# Patient Record
Sex: FEMALE | Race: WHITE | NOT HISPANIC OR LATINO | Employment: PART TIME | URBAN - METROPOLITAN AREA
[De-identification: names, ages, dates, MRNs, and addresses within clinical notes are randomized per-mention and may not be internally consistent; named-entity substitution may affect disease eponyms.]

---

## 2018-05-18 ENCOUNTER — HISTORICAL (OUTPATIENT)
Dept: INTERNAL MEDICINE | Facility: CLINIC | Age: 58
End: 2018-05-18

## 2018-05-18 LAB
ABS NEUT (OLG): 3.2 X10(3)/MCL (ref 2.1–9.2)
APPEARANCE, UA: CLEAR
BACTERIA #/AREA URNS AUTO: ABNORMAL /[HPF]
BASOPHILS # BLD AUTO: 0.02 X10(3)/MCL
BASOPHILS NFR BLD AUTO: 0 %
BILIRUB UR QL STRIP: NEGATIVE
BUN SERPL-MCNC: 9 MG/DL (ref 7–18)
CALCIUM SERPL-MCNC: 9.3 MG/DL (ref 8.5–10.1)
CHLORIDE SERPL-SCNC: 108 MMOL/L (ref 98–107)
CHOLEST SERPL-MCNC: 175 MG/DL
CHOLEST/HDLC SERPL: 3.6 {RATIO} (ref 0–4.4)
CO2 SERPL-SCNC: 29 MMOL/L (ref 21–32)
COLOR UR: NORMAL
CREAT SERPL-MCNC: 0.8 MG/DL (ref 0.6–1.3)
CREAT UR-MCNC: 52 MG/DL
CREAT/UREA NIT SERPL: 11
DEPRECATED CALCIDIOL+CALCIFEROL SERPL-MC: 23.17 NG/ML (ref 30–80)
EOSINOPHIL # BLD AUTO: 0.12 X10(3)/MCL
EOSINOPHIL NFR BLD AUTO: 2 %
ERYTHROCYTE [DISTWIDTH] IN BLOOD BY AUTOMATED COUNT: 13.1 % (ref 11.5–14.5)
GLUCOSE (UA): NORMAL
GLUCOSE SERPL-MCNC: 92 MG/DL (ref 74–106)
HCT VFR BLD AUTO: 46.6 % (ref 35–46)
HDLC SERPL-MCNC: 49 MG/DL
HGB BLD-MCNC: 14.8 GM/DL (ref 12–16)
HGB UR QL STRIP: NEGATIVE
HYALINE CASTS #/AREA URNS LPF: ABNORMAL /[LPF]
IMM GRANULOCYTES # BLD AUTO: 0.02 10*3/UL
IMM GRANULOCYTES NFR BLD AUTO: 0 %
KETONES UR QL STRIP: NEGATIVE
LDLC SERPL CALC-MCNC: 108 MG/DL (ref 0–130)
LEUKOCYTE ESTERASE UR QL STRIP: NEGATIVE
LYMPHOCYTES # BLD AUTO: 1.9 X10(3)/MCL
LYMPHOCYTES NFR BLD AUTO: 33 % (ref 13–40)
MCH RBC QN AUTO: 30.6 PG (ref 26–34)
MCHC RBC AUTO-ENTMCNC: 31.8 GM/DL (ref 31–37)
MCV RBC AUTO: 96.3 FL (ref 80–100)
MICROALBUMIN UR-MCNC: <5 MG/L (ref 0–19)
MICROALBUMIN/CREAT RATIO PNL UR: <9.6 MCG/MG CR (ref 0–29)
MONOCYTES # BLD AUTO: 0.56 X10(3)/MCL
MONOCYTES NFR BLD AUTO: 10 % (ref 4–12)
NEUTROPHILS # BLD AUTO: 3.2 X10(3)/MCL
NEUTROPHILS NFR BLD AUTO: 55 X10(3)/MCL
NITRITE UR QL STRIP: NEGATIVE
PH UR STRIP: 8 [PH] (ref 4.5–8)
PLATELET # BLD AUTO: 346 X10(3)/MCL (ref 130–400)
PMV BLD AUTO: 10.6 FL (ref 7.4–10.4)
POTASSIUM SERPL-SCNC: 4 MMOL/L (ref 3.5–5.1)
PROT UR QL STRIP: NEGATIVE
RBC # BLD AUTO: 4.84 X10(6)/MCL (ref 4–5.2)
RBC #/AREA URNS AUTO: ABNORMAL /[HPF]
SODIUM SERPL-SCNC: 141 MMOL/L (ref 136–145)
SP GR UR STRIP: 1.01 (ref 1–1.03)
SQUAMOUS #/AREA URNS LPF: ABNORMAL /[LPF]
TRIGL SERPL-MCNC: 90 MG/DL
TSH SERPL-ACNC: 2.86 MIU/L (ref 0.36–3.74)
UROBILINOGEN UR STRIP-ACNC: NORMAL
VLDLC SERPL CALC-MCNC: 18 MG/DL
WBC # SPEC AUTO: 5.8 X10(3)/MCL (ref 4.5–11)
WBC #/AREA URNS AUTO: ABNORMAL /HPF

## 2018-07-09 ENCOUNTER — HISTORICAL (OUTPATIENT)
Dept: INTERNAL MEDICINE | Facility: CLINIC | Age: 58
End: 2018-07-09

## 2018-12-27 ENCOUNTER — HISTORICAL (OUTPATIENT)
Dept: ADMINISTRATIVE | Facility: HOSPITAL | Age: 58
End: 2018-12-27

## 2019-04-03 LAB
HUMAN PAPILLOMAVIRUS (HPV): NORMAL
PAP RECOMMENDATION EXT: NORMAL
PAP SMEAR: NORMAL

## 2019-06-21 ENCOUNTER — HISTORICAL (OUTPATIENT)
Dept: INTERNAL MEDICINE | Facility: CLINIC | Age: 59
End: 2019-06-21

## 2019-06-21 ENCOUNTER — HISTORICAL (OUTPATIENT)
Dept: ADMINISTRATIVE | Facility: HOSPITAL | Age: 59
End: 2019-06-21

## 2019-06-21 LAB
ABS NEUT (OLG): 3.34 X10(3)/MCL (ref 2.1–9.2)
ALBUMIN SERPL-MCNC: 4.1 GM/DL (ref 3.4–5)
ALBUMIN/GLOB SERPL: 1.1 RATIO (ref 1.1–2)
ALP SERPL-CCNC: 141 UNIT/L (ref 45–117)
ALT SERPL-CCNC: 20 UNIT/L (ref 12–78)
APPEARANCE, UA: CLEAR
AST SERPL-CCNC: 10 UNIT/L (ref 15–37)
BACTERIA #/AREA URNS AUTO: ABNORMAL /[HPF]
BASOPHILS # BLD AUTO: 0.01 X10(3)/MCL
BASOPHILS NFR BLD AUTO: 0 %
BILIRUB SERPL-MCNC: 0.7 MG/DL (ref 0.2–1)
BILIRUB UR QL STRIP: NEGATIVE
BILIRUBIN DIRECT+TOT PNL SERPL-MCNC: 0.2 MG/DL
BILIRUBIN DIRECT+TOT PNL SERPL-MCNC: 0.5 MG/DL
BUN SERPL-MCNC: 14 MG/DL (ref 7–18)
CALCIUM SERPL-MCNC: 9.6 MG/DL (ref 8.5–10.1)
CHLORIDE SERPL-SCNC: 107 MMOL/L (ref 98–107)
CHOLEST SERPL-MCNC: 167 MG/DL
CHOLEST/HDLC SERPL: 3.5 {RATIO} (ref 0–4.4)
CO2 SERPL-SCNC: 30 MMOL/L (ref 21–32)
COLOR UR: YELLOW
CREAT SERPL-MCNC: 0.9 MG/DL (ref 0.6–1.3)
EOSINOPHIL # BLD AUTO: 0.08 X10(3)/MCL
EOSINOPHIL NFR BLD AUTO: 1 %
ERYTHROCYTE [DISTWIDTH] IN BLOOD BY AUTOMATED COUNT: 13.2 % (ref 11.5–14.5)
EST. AVERAGE GLUCOSE BLD GHB EST-MCNC: 108 MG/DL
GLOBULIN SER-MCNC: 3.6 GM/ML (ref 2.3–3.5)
GLUCOSE (UA): NORMAL
GLUCOSE SERPL-MCNC: 91 MG/DL (ref 74–106)
HBA1C MFR BLD: 5.4 % (ref 4.2–6.3)
HCT VFR BLD AUTO: 48.6 % (ref 35–46)
HDLC SERPL-MCNC: 48 MG/DL
HGB BLD-MCNC: 15.2 GM/DL (ref 12–16)
HGB UR QL STRIP: NEGATIVE
HIV 1+2 AB+HIV1 P24 AG SERPL QL IA: NONREACTIVE
HYALINE CASTS #/AREA URNS LPF: ABNORMAL /[LPF]
IMM GRANULOCYTES # BLD AUTO: 0.01 10*3/UL
IMM GRANULOCYTES NFR BLD AUTO: 0 %
KETONES UR QL STRIP: NEGATIVE
LDLC SERPL CALC-MCNC: 99 MG/DL (ref 0–130)
LEUKOCYTE ESTERASE UR QL STRIP: 500 LEU/UL
LYMPHOCYTES # BLD AUTO: 1.81 X10(3)/MCL
LYMPHOCYTES NFR BLD AUTO: 31 % (ref 13–40)
MCH RBC QN AUTO: 31.1 PG (ref 26–34)
MCHC RBC AUTO-ENTMCNC: 31.3 GM/DL (ref 31–37)
MCV RBC AUTO: 99.6 FL (ref 80–100)
MONOCYTES # BLD AUTO: 0.6 X10(3)/MCL
MONOCYTES NFR BLD AUTO: 10 % (ref 4–12)
NEUTROPHILS # BLD AUTO: 3.34 X10(3)/MCL
NEUTROPHILS NFR BLD AUTO: 57 X10(3)/MCL
NITRITE UR QL STRIP: NEGATIVE
PH UR STRIP: 6.5 [PH] (ref 4.5–8)
PLATELET # BLD AUTO: 363 X10(3)/MCL (ref 130–400)
PMV BLD AUTO: 10.3 FL (ref 7.4–10.4)
POTASSIUM SERPL-SCNC: 4 MMOL/L (ref 3.5–5.1)
PROT SERPL-MCNC: 7.7 GM/DL (ref 6.4–8.2)
PROT UR QL STRIP: NEGATIVE
RBC # BLD AUTO: 4.88 X10(6)/MCL (ref 4–5.2)
RBC #/AREA URNS AUTO: ABNORMAL /[HPF]
SODIUM SERPL-SCNC: 140 MMOL/L (ref 136–145)
SP GR UR STRIP: 1.02 (ref 1–1.03)
SQUAMOUS #/AREA URNS LPF: >100 /[LPF]
TRIGL SERPL-MCNC: 98 MG/DL
TSH SERPL-ACNC: 3.59 MIU/L (ref 0.36–3.74)
UROBILINOGEN UR STRIP-ACNC: NORMAL
VLDLC SERPL CALC-MCNC: 20 MG/DL
WBC # SPEC AUTO: 5.8 X10(3)/MCL (ref 4.5–11)
WBC #/AREA URNS AUTO: ABNORMAL /HPF

## 2019-06-23 LAB — FINAL CULTURE: NORMAL

## 2019-07-19 ENCOUNTER — HISTORICAL (OUTPATIENT)
Dept: INTERNAL MEDICINE | Facility: CLINIC | Age: 59
End: 2019-07-19

## 2019-08-28 ENCOUNTER — HISTORICAL (OUTPATIENT)
Dept: RADIOLOGY | Facility: HOSPITAL | Age: 59
End: 2019-08-28

## 2019-09-13 ENCOUNTER — HISTORICAL (OUTPATIENT)
Dept: RADIOLOGY | Facility: HOSPITAL | Age: 59
End: 2019-09-13

## 2019-11-08 ENCOUNTER — HISTORICAL (OUTPATIENT)
Dept: RADIOLOGY | Facility: HOSPITAL | Age: 59
End: 2019-11-08

## 2019-12-23 ENCOUNTER — HISTORICAL (OUTPATIENT)
Dept: INTERNAL MEDICINE | Facility: CLINIC | Age: 59
End: 2019-12-23

## 2019-12-23 LAB
ABS NEUT (OLG): 3.07 X10(3)/MCL (ref 2.1–9.2)
ALBUMIN SERPL-MCNC: 3.9 GM/DL (ref 3.4–5)
ALBUMIN/GLOB SERPL: 1.1 RATIO (ref 1.1–2)
ALP SERPL-CCNC: 150 UNIT/L (ref 45–117)
ALT SERPL-CCNC: 29 UNIT/L (ref 12–78)
APPEARANCE, UA: CLEAR
AST SERPL-CCNC: 21 UNIT/L (ref 15–37)
BACTERIA #/AREA URNS AUTO: ABNORMAL /HPF
BASOPHILS # BLD AUTO: 0 X10(3)/MCL (ref 0–0.2)
BASOPHILS NFR BLD AUTO: 0 %
BILIRUB SERPL-MCNC: 0.6 MG/DL (ref 0.2–1)
BILIRUB UR QL STRIP: NEGATIVE
BILIRUBIN DIRECT+TOT PNL SERPL-MCNC: 0.2 MG/DL (ref 0–0.2)
BILIRUBIN DIRECT+TOT PNL SERPL-MCNC: 0.4 MG/DL
BUN SERPL-MCNC: 12 MG/DL (ref 7–18)
CALCIUM SERPL-MCNC: 9.4 MG/DL (ref 8.5–10.1)
CHLORIDE SERPL-SCNC: 106 MMOL/L (ref 98–107)
CO2 SERPL-SCNC: 31 MMOL/L (ref 21–32)
COLOR UR: NORMAL
CREAT SERPL-MCNC: 0.8 MG/DL (ref 0.6–1.3)
EOSINOPHIL # BLD AUTO: 0.1 X10(3)/MCL (ref 0–0.9)
EOSINOPHIL NFR BLD AUTO: 2 %
ERYTHROCYTE [DISTWIDTH] IN BLOOD BY AUTOMATED COUNT: 13.1 % (ref 11.5–14.5)
GLOBULIN SER-MCNC: 3.6 GM/ML (ref 2.3–3.5)
GLUCOSE (UA): NEGATIVE
GLUCOSE SERPL-MCNC: 96 MG/DL (ref 74–106)
HCT VFR BLD AUTO: 45.6 % (ref 35–46)
HGB BLD-MCNC: 14.4 GM/DL (ref 12–16)
HGB UR QL STRIP: NEGATIVE
HYALINE CASTS #/AREA URNS LPF: ABNORMAL /LPF
IMM GRANULOCYTES # BLD AUTO: 0.04 10*3/UL
IMM GRANULOCYTES NFR BLD AUTO: 1 %
KETONES UR QL STRIP: NEGATIVE
LEUKOCYTE ESTERASE UR QL STRIP: NEGATIVE
LYMPHOCYTES # BLD AUTO: 1.9 X10(3)/MCL (ref 0.6–4.6)
LYMPHOCYTES NFR BLD AUTO: 33 %
MCH RBC QN AUTO: 31.2 PG (ref 26–34)
MCHC RBC AUTO-ENTMCNC: 31.6 GM/DL (ref 31–37)
MCV RBC AUTO: 98.7 FL (ref 80–100)
MONOCYTES # BLD AUTO: 0.7 X10(3)/MCL (ref 0.1–1.3)
MONOCYTES NFR BLD AUTO: 12 %
NEUTROPHILS # BLD AUTO: 3.07 X10(3)/MCL (ref 2.1–9.2)
NEUTROPHILS NFR BLD AUTO: 53 %
NITRITE UR QL STRIP: NEGATIVE
PH UR STRIP: 7.5 [PH] (ref 4.5–8)
PLATELET # BLD AUTO: 362 X10(3)/MCL (ref 130–400)
PMV BLD AUTO: 10.5 FL (ref 7.4–10.4)
POTASSIUM SERPL-SCNC: 4.2 MMOL/L (ref 3.5–5.1)
PROT SERPL-MCNC: 7.5 GM/DL (ref 6.4–8.2)
PROT UR QL STRIP: NEGATIVE
RBC # BLD AUTO: 4.62 X10(6)/MCL (ref 4–5.2)
RBC #/AREA URNS AUTO: ABNORMAL /HPF
SODIUM SERPL-SCNC: 143 MMOL/L (ref 136–145)
SP GR UR STRIP: 1.01 (ref 1–1.03)
SQUAMOUS #/AREA URNS LPF: ABNORMAL /LPF
UROBILINOGEN UR STRIP-ACNC: NORMAL
WBC # SPEC AUTO: 5.8 X10(3)/MCL (ref 4.5–11)
WBC #/AREA URNS AUTO: ABNORMAL /HPF

## 2020-05-25 ENCOUNTER — HISTORICAL (OUTPATIENT)
Dept: CARDIOLOGY | Facility: HOSPITAL | Age: 60
End: 2020-05-25

## 2020-06-16 ENCOUNTER — HISTORICAL (OUTPATIENT)
Dept: INTERNAL MEDICINE | Facility: CLINIC | Age: 60
End: 2020-06-16

## 2020-06-16 LAB
ABS NEUT (OLG): 3.11 X10(3)/MCL (ref 2.1–9.2)
ALBUMIN SERPL-MCNC: 3.8 GM/DL (ref 3.4–5)
ALBUMIN/GLOB SERPL: 1 RATIO (ref 1.1–2)
ALP SERPL-CCNC: 127 UNIT/L (ref 45–117)
ALT SERPL-CCNC: 21 UNIT/L (ref 12–78)
APPEARANCE, UA: CLEAR
AST SERPL-CCNC: 12 UNIT/L (ref 15–37)
BACTERIA #/AREA URNS AUTO: ABNORMAL /HPF
BASOPHILS # BLD AUTO: 0 X10(3)/MCL (ref 0–0.2)
BASOPHILS NFR BLD AUTO: 0 %
BILIRUB SERPL-MCNC: 0.7 MG/DL (ref 0.2–1)
BILIRUB UR QL STRIP: NEGATIVE
BILIRUBIN DIRECT+TOT PNL SERPL-MCNC: 0.2 MG/DL (ref 0–0.2)
BILIRUBIN DIRECT+TOT PNL SERPL-MCNC: 0.5 MG/DL
BUN SERPL-MCNC: 11 MG/DL (ref 7–18)
CALCIUM SERPL-MCNC: 9.2 MG/DL (ref 8.5–10.1)
CHLORIDE SERPL-SCNC: 108 MMOL/L (ref 98–107)
CHOLEST SERPL-MCNC: 164 MG/DL
CHOLEST/HDLC SERPL: 3.6 {RATIO} (ref 0–4.4)
CO2 SERPL-SCNC: 30 MMOL/L (ref 21–32)
COLOR UR: NORMAL
CREAT SERPL-MCNC: 0.8 MG/DL (ref 0.6–1.3)
EOSINOPHIL # BLD AUTO: 0.2 X10(3)/MCL (ref 0–0.9)
EOSINOPHIL NFR BLD AUTO: 3 %
ERYTHROCYTE [DISTWIDTH] IN BLOOD BY AUTOMATED COUNT: 13.1 % (ref 11.5–14.5)
EST. AVERAGE GLUCOSE BLD GHB EST-MCNC: 103 MG/DL
GLOBULIN SER-MCNC: 3.9 GM/ML (ref 2.3–3.5)
GLUCOSE (UA): NEGATIVE
GLUCOSE SERPL-MCNC: 94 MG/DL (ref 74–106)
HBA1C MFR BLD: 5.2 % (ref 4.2–6.3)
HCT VFR BLD AUTO: 46.8 % (ref 35–46)
HDLC SERPL-MCNC: 45 MG/DL (ref 40–59)
HGB BLD-MCNC: 14.8 GM/DL (ref 12–16)
HGB UR QL STRIP: NEGATIVE
HYALINE CASTS #/AREA URNS LPF: ABNORMAL /LPF
IMM GRANULOCYTES # BLD AUTO: 0.01 10*3/UL
IMM GRANULOCYTES NFR BLD AUTO: 0 %
KETONES UR QL STRIP: NEGATIVE
LDLC SERPL CALC-MCNC: 100 MG/DL
LEUKOCYTE ESTERASE UR QL STRIP: NEGATIVE
LYMPHOCYTES # BLD AUTO: 1.8 X10(3)/MCL (ref 0.6–4.6)
LYMPHOCYTES NFR BLD AUTO: 31 %
MCH RBC QN AUTO: 31.3 PG (ref 26–34)
MCHC RBC AUTO-ENTMCNC: 31.6 GM/DL (ref 31–37)
MCV RBC AUTO: 98.9 FL (ref 80–100)
MONOCYTES # BLD AUTO: 0.6 X10(3)/MCL (ref 0.1–1.3)
MONOCYTES NFR BLD AUTO: 11 %
NEUTROPHILS # BLD AUTO: 3.11 X10(3)/MCL (ref 2.1–9.2)
NEUTROPHILS NFR BLD AUTO: 54 %
NITRITE UR QL STRIP: NEGATIVE
PH UR STRIP: 8 [PH] (ref 4.5–8)
PLATELET # BLD AUTO: 332 X10(3)/MCL (ref 130–400)
PMV BLD AUTO: 10.4 FL (ref 7.4–10.4)
POTASSIUM SERPL-SCNC: 4.6 MMOL/L (ref 3.5–5.1)
PROT SERPL-MCNC: 7.7 GM/DL (ref 6.4–8.2)
PROT UR QL STRIP: NEGATIVE
RBC # BLD AUTO: 4.73 X10(6)/MCL (ref 4–5.2)
RBC #/AREA URNS AUTO: ABNORMAL /HPF
SODIUM SERPL-SCNC: 142 MMOL/L (ref 136–145)
SP GR UR STRIP: 1.01 (ref 1–1.03)
SQUAMOUS #/AREA URNS LPF: ABNORMAL /LPF
TRIGL SERPL-MCNC: 96 MG/DL
TSH SERPL-ACNC: 3.07 MIU/L (ref 0.36–3.74)
UROBILINOGEN UR STRIP-ACNC: NORMAL
VLDLC SERPL CALC-MCNC: 19 MG/DL
WBC # SPEC AUTO: 5.7 X10(3)/MCL (ref 4.5–11)
WBC #/AREA URNS AUTO: ABNORMAL /HPF

## 2020-09-23 ENCOUNTER — HISTORICAL (OUTPATIENT)
Dept: RADIOLOGY | Facility: HOSPITAL | Age: 60
End: 2020-09-23

## 2021-02-26 ENCOUNTER — HISTORICAL (OUTPATIENT)
Dept: RADIOLOGY | Facility: HOSPITAL | Age: 61
End: 2021-02-26

## 2021-04-20 ENCOUNTER — HISTORICAL (OUTPATIENT)
Dept: RADIOLOGY | Facility: HOSPITAL | Age: 61
End: 2021-04-20

## 2021-06-22 ENCOUNTER — HISTORICAL (OUTPATIENT)
Dept: INTERNAL MEDICINE | Facility: CLINIC | Age: 61
End: 2021-06-22

## 2021-06-22 LAB
ABS NEUT (OLG): 3.48 X10(3)/MCL (ref 2.1–9.2)
ALBUMIN SERPL-MCNC: 4.1 GM/DL (ref 3.5–5)
ALBUMIN/GLOB SERPL: 1.2 RATIO (ref 1.1–2)
ALP SERPL-CCNC: 124 UNIT/L (ref 40–150)
ALT SERPL-CCNC: 18 UNIT/L (ref 0–55)
APPEARANCE, UA: CLEAR
AST SERPL-CCNC: 20 UNIT/L (ref 5–34)
BACTERIA #/AREA URNS AUTO: ABNORMAL /HPF
BASOPHILS # BLD AUTO: 0 X10(3)/MCL (ref 0–0.2)
BASOPHILS NFR BLD AUTO: 0 %
BILIRUB SERPL-MCNC: 0.8 MG/DL
BILIRUB UR QL STRIP: NEGATIVE
BILIRUBIN DIRECT+TOT PNL SERPL-MCNC: 0.3 MG/DL (ref 0–0.5)
BILIRUBIN DIRECT+TOT PNL SERPL-MCNC: 0.5 MG/DL (ref 0–0.8)
BUN SERPL-MCNC: 10.3 MG/DL (ref 9.8–20.1)
CALCIUM SERPL-MCNC: 9.9 MG/DL (ref 8.4–10.2)
CHLORIDE SERPL-SCNC: 104 MMOL/L (ref 98–107)
CHOLEST SERPL-MCNC: 161 MG/DL
CHOLEST/HDLC SERPL: 4 {RATIO} (ref 0–5)
CO2 SERPL-SCNC: 29 MMOL/L (ref 22–29)
COLOR UR: ABNORMAL
CREAT SERPL-MCNC: 0.87 MG/DL (ref 0.55–1.02)
EOSINOPHIL # BLD AUTO: 0.1 X10(3)/MCL (ref 0–0.9)
EOSINOPHIL NFR BLD AUTO: 1 %
ERYTHROCYTE [DISTWIDTH] IN BLOOD BY AUTOMATED COUNT: 13.3 % (ref 11.5–14.5)
EST. AVERAGE GLUCOSE BLD GHB EST-MCNC: 102.5 MG/DL
GLOBULIN SER-MCNC: 3.5 GM/DL (ref 2.4–3.5)
GLUCOSE (UA): NEGATIVE
GLUCOSE SERPL-MCNC: 86 MG/DL (ref 74–100)
HBA1C MFR BLD: 5.2 %
HCT VFR BLD AUTO: 48.2 % (ref 35–46)
HDLC SERPL-MCNC: 42 MG/DL (ref 35–60)
HGB BLD-MCNC: 15 GM/DL (ref 12–16)
HGB UR QL STRIP: NEGATIVE
HYALINE CASTS #/AREA URNS LPF: ABNORMAL /LPF
IMM GRANULOCYTES # BLD AUTO: 0.02 10*3/UL
IMM GRANULOCYTES NFR BLD AUTO: 0 %
KETONES UR QL STRIP: NEGATIVE
LDLC SERPL CALC-MCNC: 98 MG/DL (ref 50–140)
LEUKOCYTE ESTERASE UR QL STRIP: NEGATIVE
LYMPHOCYTES # BLD AUTO: 2.1 X10(3)/MCL (ref 0.6–4.6)
LYMPHOCYTES NFR BLD AUTO: 34 %
MCH RBC QN AUTO: 30.5 PG (ref 26–34)
MCHC RBC AUTO-ENTMCNC: 31.1 GM/DL (ref 31–37)
MCV RBC AUTO: 98 FL (ref 80–100)
MONOCYTES # BLD AUTO: 0.6 X10(3)/MCL (ref 0.1–1.3)
MONOCYTES NFR BLD AUTO: 9 %
NEUTROPHILS # BLD AUTO: 3.48 X10(3)/MCL (ref 2.1–9.2)
NEUTROPHILS NFR BLD AUTO: 56 %
NITRITE UR QL STRIP: NEGATIVE
NRBC BLD AUTO-RTO: 0 % (ref 0–0.2)
PH UR STRIP: 8.5 [PH] (ref 4.5–8)
PLATELET # BLD AUTO: 386 X10(3)/MCL (ref 130–400)
PMV BLD AUTO: 10.7 FL (ref 7.4–10.4)
POTASSIUM SERPL-SCNC: 4 MMOL/L (ref 3.5–5.1)
PROT SERPL-MCNC: 7.6 GM/DL (ref 6.4–8.3)
PROT UR QL STRIP: NEGATIVE
RBC # BLD AUTO: 4.92 X10(6)/MCL (ref 4–5.2)
RBC #/AREA URNS AUTO: ABNORMAL /HPF
SODIUM SERPL-SCNC: 141 MMOL/L (ref 136–145)
SP GR UR STRIP: 1 (ref 1–1.03)
SQUAMOUS #/AREA URNS LPF: ABNORMAL /LPF
TRIGL SERPL-MCNC: 106 MG/DL (ref 37–140)
TSH SERPL-ACNC: 2.86 UIU/ML (ref 0.35–4.94)
UROBILINOGEN UR STRIP-ACNC: NORMAL
VLDLC SERPL CALC-MCNC: 21 MG/DL
WBC # SPEC AUTO: 6.2 X10(3)/MCL (ref 4.5–11)
WBC #/AREA URNS AUTO: ABNORMAL /HPF

## 2021-07-21 ENCOUNTER — HISTORICAL (OUTPATIENT)
Dept: ADMINISTRATIVE | Facility: HOSPITAL | Age: 61
End: 2021-07-21

## 2021-07-21 LAB
FLUAV AG UPPER RESP QL IA.RAPID: NEGATIVE
FLUBV AG UPPER RESP QL IA.RAPID: NEGATIVE
SARS-COV-2 RNA RESP QL NAA+PROBE: DETECTED

## 2021-07-25 ENCOUNTER — HISTORICAL (OUTPATIENT)
Dept: INFECTIOUS DISEASES | Facility: HOSPITAL | Age: 61
End: 2021-07-25

## 2021-10-05 ENCOUNTER — HISTORICAL (OUTPATIENT)
Dept: RADIOLOGY | Facility: HOSPITAL | Age: 61
End: 2021-10-05

## 2021-10-05 LAB — BCS RECOMMENDATION EXT: NORMAL

## 2021-12-16 ENCOUNTER — HISTORICAL (OUTPATIENT)
Dept: INTERNAL MEDICINE | Facility: CLINIC | Age: 61
End: 2021-12-16

## 2021-12-16 LAB
ABS NEUT (OLG): 3.79 X10(3)/MCL (ref 2.1–9.2)
ALBUMIN SERPL-MCNC: 4 GM/DL (ref 3.4–4.8)
ALBUMIN/GLOB SERPL: 1.1 RATIO (ref 1.1–2)
ALP SERPL-CCNC: 139 UNIT/L (ref 40–150)
ALT SERPL-CCNC: 18 UNIT/L (ref 0–55)
AST SERPL-CCNC: 17 UNIT/L (ref 5–34)
BASOPHILS # BLD AUTO: 0 X10(3)/MCL (ref 0–0.2)
BASOPHILS NFR BLD AUTO: 0 %
BILIRUB SERPL-MCNC: 0.7 MG/DL
BILIRUBIN DIRECT+TOT PNL SERPL-MCNC: 0.3 MG/DL (ref 0–0.5)
BILIRUBIN DIRECT+TOT PNL SERPL-MCNC: 0.4 MG/DL (ref 0–0.8)
BUN SERPL-MCNC: 12.2 MG/DL (ref 9.8–20.1)
CALCIUM SERPL-MCNC: 10.1 MG/DL (ref 8.7–10.5)
CHLORIDE SERPL-SCNC: 106 MMOL/L (ref 98–107)
CO2 SERPL-SCNC: 29 MMOL/L (ref 23–31)
CREAT SERPL-MCNC: 0.88 MG/DL (ref 0.55–1.02)
EOSINOPHIL # BLD AUTO: 0.1 X10(3)/MCL (ref 0–0.9)
EOSINOPHIL NFR BLD AUTO: 1 %
ERYTHROCYTE [DISTWIDTH] IN BLOOD BY AUTOMATED COUNT: 13.3 % (ref 11.5–14.5)
GLOBULIN SER-MCNC: 3.6 GM/DL (ref 2.4–3.5)
GLUCOSE SERPL-MCNC: 95 MG/DL (ref 82–115)
HCT VFR BLD AUTO: 47.1 % (ref 35–46)
HGB BLD-MCNC: 15.2 GM/DL (ref 12–16)
IMM GRANULOCYTES # BLD AUTO: 0.01 10*3/UL
IMM GRANULOCYTES NFR BLD AUTO: 0 %
LYMPHOCYTES # BLD AUTO: 2.1 X10(3)/MCL (ref 0.6–4.6)
LYMPHOCYTES NFR BLD AUTO: 32 %
MCH RBC QN AUTO: 31.5 PG (ref 26–34)
MCHC RBC AUTO-ENTMCNC: 32.3 GM/DL (ref 31–37)
MCV RBC AUTO: 97.5 FL (ref 80–100)
MONOCYTES # BLD AUTO: 0.6 X10(3)/MCL (ref 0.1–1.3)
MONOCYTES NFR BLD AUTO: 9 %
NEUTROPHILS # BLD AUTO: 3.79 X10(3)/MCL (ref 2.1–9.2)
NEUTROPHILS NFR BLD AUTO: 57 %
NRBC BLD AUTO-RTO: 0 % (ref 0–0.2)
PLATELET # BLD AUTO: 344 X10(3)/MCL (ref 130–400)
PMV BLD AUTO: 9.7 FL (ref 7.4–10.4)
POTASSIUM SERPL-SCNC: 4.2 MMOL/L (ref 3.5–5.1)
PROT SERPL-MCNC: 7.6 GM/DL (ref 5.8–7.6)
RBC # BLD AUTO: 4.83 X10(6)/MCL (ref 4–5.2)
SODIUM SERPL-SCNC: 142 MMOL/L (ref 136–145)
WBC # SPEC AUTO: 6.6 X10(3)/MCL (ref 4.5–11)

## 2022-03-23 ENCOUNTER — HISTORICAL (OUTPATIENT)
Dept: ENDOSCOPY | Facility: HOSPITAL | Age: 62
End: 2022-03-23

## 2022-04-10 ENCOUNTER — HISTORICAL (OUTPATIENT)
Dept: ADMINISTRATIVE | Facility: HOSPITAL | Age: 62
End: 2022-04-10

## 2022-04-25 VITALS
BODY MASS INDEX: 31.7 KG/M2 | SYSTOLIC BLOOD PRESSURE: 114 MMHG | WEIGHT: 190.25 LBS | OXYGEN SATURATION: 100 % | DIASTOLIC BLOOD PRESSURE: 75 MMHG | HEIGHT: 65 IN

## 2022-05-03 NOTE — HISTORICAL [facility] CERNER
This is a historical note converted from [employer]. Formatting and pictures may have been removed.  Please reference [employer] for original formatting and attached multimedia. Chief Complaint  F/U, no complaints  History of Present Illness  Initial Visit (12/27/18):58 y.o.  female presenting to the clinic to re-establish primary care. Previous PCP JR Ge NP. Last OV 7/25/18. PMHx significant for HTN. Bp?at goal.?Taking Amlodipine 10 mg po daily. Today, pt c/o cough, chest congestion, and chest soreness x 2+ weeks. Cough is nonproductive.?Denies fever, chills, sore throat, or ear pain. Associated symptoms include HA. Denies SOB at present or wheezing. Not taking any medications for cough. No other problems stated.  ?   Todays Visit (6/26/19):58 y.o.  female with PMHx significant for HTN, presenting for routine f/u and lab results. Bp?at goal.?Taking Amlodipine 10 mg po daily. Alk phos elevated, although improved from prior assessments. Pt with hx of cholecystectomy. She denies any abdominal complaints. UA also revealed increased WBCs and leuk esterase. No bacteria.?Pt denies dysuria, urinary urgency, urinary frequency, malodorous urine, abd pain, or any urinary complaints. Denies CP or SOB. No other problems stated.  ?   Mammo scheduled 9/6/19  Review of Systems  Constitutional:?no fever, fatigue, weakness  Eye:?no vision loss, eye redness, drainage, or pain  ENMT:?no sore throat, ear pain, sinus pain/congestion, nasal congestion/drainage  Respiratory:?no cough, no wheezing, no shortness of breath  Cardiovascular:?no chest pain, no palpitations, no edema  Gastrointestinal:?no nausea, vomiting, or diarrhea. No abdominal pain  Genitourinary:?no dysuria, no urinary frequency or urgency, no hematuria  Hema/Lymph:?no abnormal bruising or bleeding  Endocrine:?no heat or cold intolerance, no excessive thirst or excessive urination  Musculoskeletal:?no muscle or joint pain, no joint  swelling  Integumentary:?no skin rash or abnormal lesion  Neurologic: no headache, no seizure, no dizziness, no weakness or numbness  Physical Exam  Vitals & Measurements  T:?36.4? ?C (Oral)? HR:?75(Peripheral)? RR:?20? BP:?113/71?  HT:?165?cm? WT:?88.3?kg? BMI:?32.43?  General:?well-developed, well-nourished, no acute distress  Eye: PERRLA, EOMI, clear conjunctiva, eyelids normal  HENT:?oropharynx without erythema/exudate,?mucosal surfaces moist  Neck: full range of motion, no thyromegaly or lymphadenopathy; No carotid bruit  Respiratory:?clear to auscultation bilaterally. No rales, wheezing, or rhonchi. Chest expansion symmetrical  Cardiovascular:?regular rate and rhythm without murmurs, gallops, or rubs, peripheral pulses normal. No peripheral swelling  Gastrointestinal:?soft, non-tender, non-distended with normal bowel sounds, without masses to palpation  Genitourinary: no CVA tenderness to palpation  Musculoskeletal:?full range of motion of all extremities/spine without limitation or discomfort.?Normal strength and tone  Integumentary: no rashes or skin lesions present  Neurologic: cranial nerves I-XII?intact, no signs of peripheral neurological deficit, motor/sensory function intact  Psychological: Calm and cooperative. Affect and Mood appropriate. Judgment intact  Assessment/Plan  Elevated alkaline phosphatase level?R74.8  Obtain Abd US to eval  Ordered:  1160F- Medication reconciliation completed during visit, Hypertension  Elevated alkaline phosphatase level  Obesity, [facility], 06/28/19 8:56:00 CDT  CBC w/ Auto Diff, Routine collect, *Est. 12/28/19 3:00:00 CST, Blood, Order for future visit, *Est. Stop date 12/28/19 3:00:00 CST, Lab Collect, Hypertension  Elevated alkaline phosphatase level, 06/28/19 8:56:00 CDT  Clinic Follow up, *Est. 12/30/19 8:20:00 CST, Order for future visit, Elevated alkaline phosphatase level, [facility]  Comprehensive Metabolic Panel, Routine collect, *Est. 12/28/19  3:00:00 CST, Blood, Order for future visit, *Est. Stop date 12/28/19 3:00:00 CST, Lab Collect, Hypertension  Elevated alkaline phosphatase level, 06/28/19 8:56:00 CDT  Office/Outpatient Visit Level 3 Established [id number] PC, Hypertension  Elevated alkaline phosphatase level  Obesity, [facility] Int [facility] C, 06/28/19 8:56:00 CDT  Urinalysis with Microscopic if Indicated, Routine collect, Urine, Order for future visit, *Est. 12/28/19 3:00:00 CST, *Est. Stop date 12/28/19 3:00:00 CST, Nurse collect, Hypertension  Elevated alkaline phosphatase level, Print Label By Order Location  US Abdomen Limited, Routine, *Est. 08/28/19 3:00:00 CDT, Other (please specify), None, Ambulatory, Please schedule on same day as mammo if feasible, Rad Type, Order for future visit, Elevated alkaline phosphatase level, Schedule this test, [facility]...  ?  Hypertension?I10  At goal  Continue Amlodipine 10 mg po daily  Educated on aerobic exercise (3-5 days/week) and a low-fat, low-sodium diet  Avoid excess ETOH consumption. Smoking cessation if applicable  ED precautions (s/s of CVA, etc)  Ordered:  amLODIPine, 10 mg = 1 tab(s), Oral, Daily, # 90 tab(s), 3 Refill(s), Pharmacy: [facility]  1160F- Medication reconciliation completed during visit, Hypertension  Elevated alkaline phosphatase level  Obesity, [facility], 06/28/19 8:56:00 CDT  CBC w/ Auto Diff, Routine collect, *Est. 12/28/19 3:00:00 CST, Blood, Order for future visit, *Est. Stop date 12/28/19 3:00:00 CST, Lab Collect, Hypertension  Elevated alkaline phosphatase level, 06/28/19 8:56:00 CDT  Clinic Follow up, *Est. 12/30/19 8:20:00 CST, Order for future visit, Elevated alkaline phosphatase level, [facility]  Comprehensive Metabolic Panel, Routine collect, *Est. 12/28/19 3:00:00 CST, Blood, Order for future visit, *Est. Stop date 12/28/19 3:00:00 CST, Lab Collect, Hypertension  Elevated alkaline phosphatase level, 06/28/19 8:56:00 CDT  Office/Outpatient Visit  Level 3 Established [id number] PC, Hypertension  Elevated alkaline phosphatase level  Obesity, [facility], 06/28/19 8:56:00 CDT  Urinalysis with Microscopic if Indicated, Routine collect, Urine, Order for future visit, *Est. 12/28/19 3:00:00 CST, *Est. Stop date 12/28/19 3:00:00 CST, Nurse collect, Hypertension  Elevated alkaline phosphatase level, Print Label By Order Location  ?  Obesity?E66.9  Educated on aerobic exercise for 30 mins/day, at least 5 days per week. Low-fat diet  Ordered:  1160F- Medication reconciliation completed during visit, Hypertension  Elevated alkaline phosphatase level  Obesity, [facility] Int [facility], 06/28/19 8:56:00 CDT  Office/Outpatient Visit Level 3 Established [id number] PC, Hypertension  Elevated alkaline phosphatase level  Obesity, [facility], 06/28/19 8:56:00 CDT  ?  RTC 6 months with labs prior/PRN  Referrals  Clinic Follow up, *Est. 12/30/19 8:20:00 CST, Order for future visit, Elevated alkaline phosphatase level, [facility]   Problem List/Past Medical History  Ongoing  Elevated alkaline phosphatase level  Hypertension  Obesity  Historical  Hypertension  Pregnant  Pregnant  Pregnant  Pregnant  Pregnant  Procedure/Surgical History  Fundoplication Nissen Laparoscopic (None) (06/29/2016)  Restriction of Esophagogastric Junction, Percutaneous Endoscopic Approach (06/29/2016)  Esophagogastroduodenoscopy, flexible, transoral; with biopsy, single or multiple (03/11/2016)  Excision of Small Intestine, Via Natural or Artificial Opening Endoscopic, Diagnostic (03/11/2016)  Cholecystectomy  Hernia  Tubal ligation   Medications  Norvasc 10 mg oral tablet, 10 mg= 1 tab(s), Oral, Daily, 3 refills  Allergies  Codeine Phosphate/PE/Promethazine HCl  NexIUM OTC?(Shortness of breath)  PriLOSEC OTC?(Shortness of breath)  Social History  Abuse/Neglect  No, 06/28/2019  Alcohol - Denies Alcohol Use, 04/07/2015  Never, 06/07/2016  Employment/School  Work/School description: homemaker. Operates hazardous  equipment: No., 05/12/2015  Exercise  Exercise frequency: 1-2 times/week. Self assessment: Fair condition. Exercise type: Walking, biking., 04/08/2016  Home/Environment  Lives with Children, Spouse. Living situation: Home/Independent. Alcohol abuse in household: No. Substance abuse in household: No. Smoker in household: No. Injuries/Abuse/Neglect in household: No. Feels unsafe at home: No. Safe place to go: Yes. Family/Friends available for support: Yes. Concern for family members at home: No. Major illness in household: No. Financial concerns: No. TV/Computer concerns: No., 04/08/2016  Nutrition/Health  Type of diet: regular. Regular, Caffeine intake amount: coffee daily. Wants to lose weight: Yes. Sleeping concerns: No. Feels highly stressed: No., 04/08/2016  Sexual  Sexually active: Yes. Number of current partners 1., 11/09/2018  Spiritual/Cultural  Anabaptism, 06/28/2019  Substance Use - Denies Substance Abuse, 04/07/2015  Never, 06/24/2016  Never, 06/07/2016  Tobacco - Denies Tobacco Use, 04/07/2015  Never (less than 100 in lifetime), N/A, 06/28/2019  Family History  Alcohol: Father.  Cancer: Father.  Tobacco use: Sister.  Health Maintenance  Health Maintenance  ???Pending?(in the next year)  ??? ??OverDue  ??? ? ? ?Diabetes Screening due??and every?  ??? ? ? ?Aspirin Therapy for CVD Prevention due??04/25/19??and every 1??year(s)  ??? ? ? ?Hypertension Management-Education due??04/25/19??and every 1??year(s)  ??? ??Due?  ??? ? ? ?Colorectal Screening due??05/17/19??and every 1??year(s)  ??? ??Refused?  ??? ? ? ?Tetanus Vaccine due??06/28/19??and every 10??year(s)  ??? ??Due In Future?  ??? ? ? ?ADL Screening not due until??12/27/19??and every 1??year(s)  ??? ? ? ?Alcohol Misuse Screening not due until??01/01/20??and every 1??year(s)  ??? ? ? ?Obesity Screening not due until??01/01/20??and every 1??year(s)  ??? ? ? ?Hypertension Management-BMP not due until??06/20/20??and every 1??year(s)  ??? ? ? ?Blood Pressure  Screening not due until??06/27/20??and every 1??year(s)  ??? ? ? ?Body Mass Index Check not due until??06/27/20??and every 1??year(s)  ??? ? ? ?Depression Screening not due until??06/27/20??and every 1??year(s)  ??? ? ? ?Hypertension Management-Blood Pressure not due until??06/27/20??and every 1??year(s)  ???Satisfied?(in the past 1 year)  ??? ??Satisfied?  ??? ? ? ?ADL Screening on??12/27/18.??Satisfied by Shanelle Loyd LPN  ??? ? ? ?Alcohol Misuse Screening on??06/28/19.??Satisfied by Yane Dubose NP  ??? ? ? ?Blood Pressure Screening on??06/28/19.??Satisfied by Kate Barraza LPN.  ??? ? ? ?Body Mass Index Check on??06/28/19.??Satisfied by Kate Barraza LPN.  ??? ? ? ?Breast Cancer Screening on??07/09/18.??Satisfied by Tarsha Mauro  ??? ? ? ?Cervical Cancer Screening on??04/03/19.??Satisfied by Kaykay Walsh  ??? ? ? ?Depression Screening on??06/28/19.??Satisfied by Kate Barraza LPN.  ??? ? ? ?Diabetes Screening on??06/21/19.??Satisfied by Jimmie Doe Jr.  ??? ? ? ?Hypertension Management-Blood Pressure on??06/28/19.??Satisfied by Kate Barraza LPN.  ??? ? ? ?Influenza Vaccine on??11/09/18.??Satisfied by Edith Mchugh LPN  ??? ? ? ?Lipid Screening on??06/21/19.??Satisfied by Jimmie Doe Jr.  ??? ? ? ?Obesity Screening on??06/28/19.??Satisfied by Kate Barraza LPN.  ?  Lab Results  Test Name Test Result Date/Time   Sodium Lvl 140 mmol/L 06/21/2019 06:25 CDT   Potassium Lvl 4.0 mmol/L 06/21/2019 06:25 CDT   Chloride 107 mmol/L 06/21/2019 06:25 CDT   CO2 30 mmol/L 06/21/2019 06:25 CDT   Calcium Lvl 9.6 mg/dL 06/21/2019 06:25 CDT   Glucose Lvl 91 mg/dL 06/21/2019 06:25 CDT    mg/dL 06/21/2019 06:25 CDT   BUN 14 mg/dL 06/21/2019 06:25 CDT   Creatinine 0.90 mg/dL 06/21/2019 06:25 CDT   eGFR-AA 83 mL/min (Low) 06/21/2019 06:25 CDT   eGFR-PARK 69 mL/min (Low) 06/21/2019 06:25 CDT   Bili Total 0.7 mg/dL 06/21/2019 06:25 CDT   Bili Direct 0.2 mg/dL 06/21/2019 06:25 CDT   Bili  Indirect 0.5 mg/dL 06/21/2019 06:25 CDT   AST 10 unit/L (Low) 06/21/2019 06:25 CDT   ALT 20 unit/L 06/21/2019 06:25 CDT   Alk Phos 141 unit/L (High) 06/21/2019 06:25 CDT   Total Protein 7.7 gm/dL 06/21/2019 06:25 CDT   Albumin Lvl 4.1 gm/dL 06/21/2019 06:25 CDT   Globulin 3.60 gm/mL (High) 06/21/2019 06:25 CDT   A/G Ratio 1.1 ratio 06/21/2019 06:25 CDT   Hgb A1c 5.4 % 06/21/2019 06:25 CDT   Chol 167 mg/dL 06/21/2019 06:25 CDT   HDL 48 mg/dL 06/21/2019 06:25 CDT   Trig 98 mg/dL 06/21/2019 06:25 CDT   LDL 99 mg/dL 06/21/2019 06:25 CDT   Chol/HDL 3.5 06/21/2019 06:25 CDT   VLDL 20 mg/dL 06/21/2019 06:25 CDT   TSH 3.590 mIU/L 06/21/2019 06:25 CDT   WBC 5.8 x10(3)/mcL 06/21/2019 06:25 CDT   RBC 4.88 x10(6)/mcL 06/21/2019 06:25 CDT   Hgb 15.2 gm/dL 06/21/2019 06:25 CDT   Hct 48.6 % (High) 06/21/2019 06:25 CDT   Platelet 363 x10(3)/mcL 06/21/2019 06:25 CDT   MCV 99.6 fL 06/21/2019 06:25 CDT   MCH 31.1 pg 06/21/2019 06:25 CDT   MCHC 31.3 gm/dL 06/21/2019 06:25 CDT   RDW 13.2 % 06/21/2019 06:25 CDT   MPV 10.3 fL 06/21/2019 06:25 CDT   Abs Neut 3.34 x10(3)/mcL 06/21/2019 06:25 CDT   Neutro Auto 57 x10(3)/mcL 06/21/2019 06:25 CDT   Lymph Auto 31 % 06/21/2019 06:25 CDT   Mono Auto 10 % 06/21/2019 06:25 CDT   Eos Auto 1 06/21/2019 06:25 CDT   Abs Eos 0.08 x10(3)/[facility] 06/21/2019 06:25 CDT   Basophil Auto 0 06/21/2019 06:25 CDT   Abs Neutro 3.34 x10(3)/mcL 06/21/2019 06:25 CDT   Abs Lymph 1.81 x10(3)/mcL 06/21/2019 06:25 CDT   Abs Mono 0.60 x10(3)/[facility] 06/21/2019 06:25 CDT   Abs Baso 0.01 x10(3)/[facility] 06/21/2019 06:25 CDT   IG% 0 % 06/21/2019 06:25 CDT   IG# 0.0100 06/21/2019 06:25 CDT   UA Appear Clear 06/21/2019 06:15 CDT   UA Color YELLOW 06/21/2019 06:15 CDT   UA Spec Grav 1.019 06/21/2019 06:15 CDT   UA Bili Negative 06/21/2019 06:15 CDT   UA pH 6.5 06/21/2019 06:15 CDT   UA Urobilinogen Normal 06/21/2019 06:15 CDT   UA Blood Negative 06/21/2019 06:15 CDT   UA Glucose Normal 06/21/2019 06:15 CDT   UA Ketones Negative 06/21/2019  06:15 CDT   UA Protein Negative 06/21/2019 06:15 CDT   UA Nitrite Negative 06/21/2019 06:15 CDT   UA Leuk Est 500 (Abnormal) 06/21/2019 06:15 CDT   UA WBC Interp 11-25 (Abnormal) 06/21/2019 06:15 CDT   UA RBC Interp 0-2 06/21/2019 06:15 CDT   UA Bact Interp None Seen 06/21/2019 06:15 CDT   UA Squam Epi Interp >100 (Abnormal) 06/21/2019 06:15 CDT   UA Hyal Cast Interp 6-10 (Abnormal) 06/21/2019 06:15 CDT   HIV Nonreactive 06/21/2019 06:25 CDT

## 2022-05-21 NOTE — HISTORICAL [facility] CERNER
This is a historical note converted from [employer]. Formatting and pictures may have been removed.  Please reference [facility] for original formatting and attached multimedia. Chief Complaint  Here for EGD  History of Present Illness  60 y/o WF pMHx HTN, GERD s/p Nissen fundoplication?presents today?for an?EGD. ?Patient reports?a longstanding history of reflux,?not currently on any?medication,?and unrelieved by lap Nissen fundoplication. ?Identified a?aggravating or alleviating factors. ?No fever, chills, nausea, vomiting, dysphagia, early satiety, abdominal pain, diarrhea, constipation, hematochezia, melena, weight loss.  ?  Prior endoscopy:  ?  She has had a colonoscopy scheduled in the past; however, it was canceled?as she did not believe she needed the procedure.  ?  EGD dated?March 11, 2016 per Dr. Yoder?with findings of hiatal hernia?and antral gastritis.  Review of Systems  Comprehensive Review of Systems performed with no exceptions other than as noted in HPI.  Physical Exam  Vitals & Measurements  T:?36.5? ?C (Oral)? HR:?82(Monitored)? RR:?18? BP:?135/72? SpO2:?100%? WT:?86.7?kg? BMI:?31.81?  General:?well-developed well-nourished in no acute distress  Eye: PERRLA, EOMI, clear conjunctiva  HENT:? oropharynx without erythema/exudate, oropharynx and nasal mucosal surfaces moist  Neck:? no thyromegaly or lymphadenopathy, trachea midline  Respiratory:?symmetrical chest expansion and respiratory effort, clear to auscultation bilaterally  Cardiovascular:?regular rate and rhythm without murmurs, gallops or rubs  Gastrointestinal:?soft, non-tender, non-distended, normoactive bowel sounds?without masses to palpation  Integumentary: no rashes or skin lesions present  Neurologic: cranial nerves intact, awake, alert, and oriented  Psych: good insight, appropriate mood, normal affect  Assessment/Plan  Ms. Merino is a?60 y/o WF w/?pMHx HTN, GERD s/p Nissen fundoplication?presents today?for an?EGD.  ?   Risks, benefits, and  alternatives of the procedure discussed.?  Will proceed with endoscopic procedure as scheduled.   Problem List/Past Medical History  Ongoing  2019-nCoV  Abdominal cramping  Elevated alkaline phosphatase level  Hypertension  Obesity  Visit for screening mammogram  Well adult exam  Historical  Hypertension  Pregnant  Pregnant  Pregnant  Pregnant  Pregnant  Procedure/Surgical History  Casirivi and imdevi inj (07/25/2021)  Fundoplication Nissen Laparoscopic (None) (06/29/2016)  Restriction of Esophagogastric Junction, Percutaneous Endoscopic Approach (06/29/2016)  Esophagogastroduodenoscopy, flexible, transoral; with biopsy, single or multiple (03/11/2016)  Excision of Small Intestine, Via Natural or Artificial Opening Endoscopic, Diagnostic (03/11/2016)  Cholecystectomy  Hernia  Tubal ligation   Medications  Inpatient  buffered lidocaine 2% - 0.5 ml syringe, 10 mg= 0.5 mL, Subcutaneous, As Directed  IVF Lactated Ringers LR Infusion 1,000 mL, 1000 mL, IV  Lidocaine Viscous 2% mucous membrane solution, 15 mL/EA, N/A, Once  Home  calcium carbonate 600 mg oral tablet, 600 mg= 1 tab(s), Oral, Daily  diclofenac 1% topical gel, 2 gm, TOP, QID, 3 refills  Norvasc 10 mg oral tablet, 10 mg= 1 tab(s), Oral, Daily, 3 refills  Vitamin B12  Vitamin D 1000 intl units oral tablet  Allergies  Codeine Phosphate/PE/Promethazine HCl?(patient does not recall reaction)  NexIUM OTC?(Shortness of breath)  PriLOSEC OTC?(Shortness of breath)  Social History  Abuse/Neglect  No, No, Yes, 03/23/2022  Alcohol - Denies Alcohol Use, 04/07/2015  Never, 06/07/2016  Employment/School  Work/School description: homemaker. Operates hazardous equipment: No., 05/12/2015  Exercise  Exercise frequency: 1-2 times/week. Self assessment: Fair condition. Exercise type: Walking, biking., 04/08/2016  Financial/Legal Situation  None, 06/23/2021  Home/Environment  Lives with Children, Spouse. Living situation: Home/Independent. Alcohol abuse in household: No.  Substance abuse in household: No. Smoker in household: No. Injuries/Abuse/Neglect in household: No. Feels unsafe at home: No. Safe place to go: Yes. Family/Friends available for support: Yes. Concern for family members at home: No. Major illness in household: No. Financial concerns: No. TV/Computer concerns: No., 04/08/2016    Never in , 03/23/2021  Nutrition/Health  Type of diet: regular. Regular, Caffeine intake amount: coffee daily. Wants to lose weight: Yes. Sleeping concerns: No. Feels highly stressed: No., 04/08/2016  Sexual  Sexually active: Yes. 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  Cancer: Father.  Hypertension.: Mother and Sister.  Tobacco use: Sister.  Immunizations  Vaccine Date Status   COVID-19 MRNA, LNP-S, PF- Pfizer 11/15/2021 Recorded   COVID-19 MRNA, LNP-S, PF- Pfizer 10/25/2021 Recorded   tuberculin purified protein derivative 07/07/2015 Given       She reports a longstanding history of burning chest pain?following eating.? These were similar symptoms she has had in the past that warranted an EGD and then a subsequent lap Nissen fundoplication in 2016. ?Her symptoms are unchanged despite having a fundoplication.? In addition to burning chest pain, she describes epigastric pain as well.? She often times?just?allows the symptoms to resolve on their own. ?She is not currently on a PPI and denies taking a PPI recently.? She does take as needed?Mylanta?with good relief. ?She denies any dysphagia. ?Her bowel movements are regular without any constipation, diarrhea, rectal bleeding or melena.? Her last EGD was prior to her fundoplication?was in 2016 and described a hiatal hernia, unclear?size along with some?antral gastritis.? Her weight is  stable. ?She denies any family history of?GI illnesses, colon polyps, colon cancer.? She has never had a colonoscopy. ?She does not smoke or drink significant alcohol.

## 2022-06-20 ENCOUNTER — LAB VISIT (OUTPATIENT)
Dept: LAB | Facility: HOSPITAL | Age: 62
End: 2022-06-20
Attending: NURSE PRACTITIONER

## 2022-06-20 DIAGNOSIS — I10 HYPERTENSION, UNSPECIFIED TYPE: ICD-10-CM

## 2022-06-20 DIAGNOSIS — Z00.00 WELLNESS EXAMINATION: Primary | ICD-10-CM

## 2022-06-20 DIAGNOSIS — K21.9 GASTROESOPHAGEAL REFLUX DISEASE, UNSPECIFIED WHETHER ESOPHAGITIS PRESENT: ICD-10-CM

## 2022-06-20 LAB
ALBUMIN SERPL-MCNC: 4.3 GM/DL (ref 3.4–4.8)
ALBUMIN/GLOB SERPL: 1.4 RATIO (ref 1.1–2)
ALP SERPL-CCNC: 135 UNIT/L (ref 40–150)
ALT SERPL-CCNC: 15 UNIT/L (ref 0–55)
APPEARANCE UR: CLEAR
AST SERPL-CCNC: 15 UNIT/L (ref 5–34)
BACTERIA #/AREA URNS AUTO: ABNORMAL /HPF
BASOPHILS # BLD AUTO: 0.02 X10(3)/MCL (ref 0–0.2)
BASOPHILS NFR BLD AUTO: 0.3 %
BILIRUB UR QL STRIP.AUTO: NEGATIVE MG/DL
BILIRUBIN DIRECT+TOT PNL SERPL-MCNC: 0.9 MG/DL
BUN SERPL-MCNC: 15 MG/DL (ref 9.8–20.1)
CALCIUM SERPL-MCNC: 10.1 MG/DL (ref 8.4–10.2)
CHLORIDE SERPL-SCNC: 105 MMOL/L (ref 98–107)
CHOLEST SERPL-MCNC: 169 MG/DL
CHOLEST/HDLC SERPL: 4 {RATIO} (ref 0–5)
CO2 SERPL-SCNC: 28 MMOL/L (ref 23–31)
COLOR UR AUTO: COLORLESS
CREAT SERPL-MCNC: 0.95 MG/DL (ref 0.55–1.02)
EOSINOPHIL # BLD AUTO: 0.08 X10(3)/MCL (ref 0–0.9)
EOSINOPHIL NFR BLD AUTO: 1.4 %
ERYTHROCYTE [DISTWIDTH] IN BLOOD BY AUTOMATED COUNT: 13.1 % (ref 11.5–17)
EST. AVERAGE GLUCOSE BLD GHB EST-MCNC: 99.7 MG/DL
GLOBULIN SER-MCNC: 3 GM/DL (ref 2.4–3.5)
GLUCOSE SERPL-MCNC: 99 MG/DL (ref 82–115)
GLUCOSE UR QL STRIP.AUTO: NORMAL MG/DL
HAV IGM SERPL QL IA: NONREACTIVE
HBA1C MFR BLD: 5.1 %
HBV CORE IGM SERPL QL IA: NONREACTIVE
HBV SURFACE AG SERPL QL IA: NONREACTIVE
HCT VFR BLD AUTO: 50.1 % (ref 37–47)
HCV AB SERPL QL IA: NONREACTIVE
HDLC SERPL-MCNC: 42 MG/DL (ref 35–60)
HGB BLD-MCNC: 15.3 GM/DL (ref 12–16)
HIV 1+2 AB+HIV1 P24 AG SERPL QL IA: NONREACTIVE
HYALINE CASTS #/AREA URNS LPF: ABNORMAL /LPF
IMM GRANULOCYTES # BLD AUTO: 0.02 X10(3)/MCL (ref 0–0.02)
IMM GRANULOCYTES NFR BLD AUTO: 0.3 % (ref 0–0.43)
KETONES UR QL STRIP.AUTO: NEGATIVE MG/DL
LDLC SERPL CALC-MCNC: 106 MG/DL (ref 50–140)
LEUKOCYTE ESTERASE UR QL STRIP.AUTO: NEGATIVE UNIT/L
LYMPHOCYTES # BLD AUTO: 1.95 X10(3)/MCL (ref 0.6–4.6)
LYMPHOCYTES NFR BLD AUTO: 33.4 %
MCH RBC QN AUTO: 30.2 PG (ref 27–31)
MCHC RBC AUTO-ENTMCNC: 30.5 MG/DL (ref 33–36)
MCV RBC AUTO: 99 FL (ref 80–94)
MONOCYTES # BLD AUTO: 0.55 X10(3)/MCL (ref 0.1–1.3)
MONOCYTES NFR BLD AUTO: 9.4 %
MUCOUS THREADS URNS QL MICRO: ABNORMAL /LPF
NEUTROPHILS # BLD AUTO: 3.2 X10(3)/MCL (ref 2.1–9.2)
NEUTROPHILS NFR BLD AUTO: 55.2 %
NITRITE UR QL STRIP.AUTO: NEGATIVE
NRBC BLD AUTO-RTO: 0 %
PH UR STRIP.AUTO: 7.5 [PH]
PLATELET # BLD AUTO: 334 X10(3)/MCL (ref 130–400)
PMV BLD AUTO: 10.2 FL (ref 9.4–12.4)
POTASSIUM SERPL-SCNC: 4.6 MMOL/L (ref 3.5–5.1)
PROT SERPL-MCNC: 7.3 GM/DL (ref 5.8–7.6)
PROT UR QL STRIP.AUTO: NEGATIVE MG/DL
RBC # BLD AUTO: 5.06 X10(6)/MCL (ref 4.2–5.4)
RBC #/AREA URNS AUTO: ABNORMAL /HPF
RBC UR QL AUTO: NEGATIVE UNIT/L
SODIUM SERPL-SCNC: 143 MMOL/L (ref 136–145)
SP GR UR STRIP.AUTO: 1.01
SQUAMOUS #/AREA URNS LPF: ABNORMAL /HPF
T PALLIDUM AB SER QL: NONREACTIVE
T PALLIDUM AB SER QL: NORMAL
TRIGL SERPL-MCNC: 105 MG/DL (ref 37–140)
TSH SERPL-ACNC: 4.11 UIU/ML (ref 0.35–4.94)
UROBILINOGEN UR STRIP-ACNC: NORMAL MG/DL
VLDLC SERPL CALC-MCNC: 21 MG/DL
WBC # SPEC AUTO: 5.8 X10(3)/MCL (ref 4.5–11.5)
WBC #/AREA URNS AUTO: ABNORMAL /HPF

## 2022-06-20 PROCEDURE — 81001 URINALYSIS AUTO W/SCOPE: CPT

## 2022-06-20 PROCEDURE — 86780 TREPONEMA PALLIDUM: CPT

## 2022-06-20 PROCEDURE — 80074 ACUTE HEPATITIS PANEL: CPT

## 2022-06-20 PROCEDURE — 84443 ASSAY THYROID STIM HORMONE: CPT

## 2022-06-20 PROCEDURE — 80053 COMPREHEN METABOLIC PANEL: CPT

## 2022-06-20 PROCEDURE — 36415 COLL VENOUS BLD VENIPUNCTURE: CPT

## 2022-06-20 PROCEDURE — 87389 HIV-1 AG W/HIV-1&-2 AB AG IA: CPT

## 2022-06-20 PROCEDURE — 83036 HEMOGLOBIN GLYCOSYLATED A1C: CPT

## 2022-06-20 PROCEDURE — 85025 COMPLETE CBC W/AUTO DIFF WBC: CPT

## 2022-06-20 PROCEDURE — 80061 LIPID PANEL: CPT

## 2022-06-22 PROBLEM — K21.9 GASTROESOPHAGEAL REFLUX DISEASE WITHOUT ESOPHAGITIS: Status: ACTIVE | Noted: 2022-06-22

## 2022-06-22 PROBLEM — E66.9 OBESITY: Status: ACTIVE | Noted: 2022-06-22

## 2022-06-22 PROBLEM — I10 HYPERTENSION: Status: ACTIVE | Noted: 2022-06-22

## 2022-06-22 PROBLEM — Z00.00 WELLNESS EXAMINATION: Status: ACTIVE | Noted: 2022-06-22

## 2022-06-22 PROBLEM — R74.8 HIGH ALKALINE PHOSPHATASE: Status: ACTIVE | Noted: 2022-06-22

## 2022-06-22 LAB — PATH REV: NORMAL

## 2022-06-23 ENCOUNTER — OFFICE VISIT (OUTPATIENT)
Dept: INTERNAL MEDICINE | Facility: CLINIC | Age: 62
End: 2022-06-23

## 2022-06-23 VITALS
HEIGHT: 65 IN | TEMPERATURE: 98 F | BODY MASS INDEX: 31.69 KG/M2 | OXYGEN SATURATION: 99 % | RESPIRATION RATE: 20 BRPM | DIASTOLIC BLOOD PRESSURE: 78 MMHG | WEIGHT: 190.19 LBS | SYSTOLIC BLOOD PRESSURE: 134 MMHG | HEART RATE: 71 BPM

## 2022-06-23 DIAGNOSIS — I10 HYPERTENSION, UNSPECIFIED TYPE: ICD-10-CM

## 2022-06-23 DIAGNOSIS — R71.8 ELEVATED HEMATOCRIT: ICD-10-CM

## 2022-06-23 DIAGNOSIS — E66.9 OBESITY, UNSPECIFIED CLASSIFICATION, UNSPECIFIED OBESITY TYPE, UNSPECIFIED WHETHER SERIOUS COMORBIDITY PRESENT: ICD-10-CM

## 2022-06-23 DIAGNOSIS — R06.09 DOE (DYSPNEA ON EXERTION): ICD-10-CM

## 2022-06-23 DIAGNOSIS — K21.9 GASTROESOPHAGEAL REFLUX DISEASE WITHOUT ESOPHAGITIS: ICD-10-CM

## 2022-06-23 DIAGNOSIS — Z12.11 SCREENING FOR COLON CANCER: ICD-10-CM

## 2022-06-23 DIAGNOSIS — Z12.39 ENCOUNTER FOR SCREENING FOR MALIGNANT NEOPLASM OF BREAST, UNSPECIFIED SCREENING MODALITY: ICD-10-CM

## 2022-06-23 DIAGNOSIS — Z00.00 WELLNESS EXAMINATION: Primary | ICD-10-CM

## 2022-06-23 PROCEDURE — 99214 PR OFFICE/OUTPT VISIT, EST, LEVL IV, 30-39 MIN: ICD-10-PCS | Mod: S$PBB,,, | Performed by: NURSE PRACTITIONER

## 2022-06-23 PROCEDURE — 99214 OFFICE O/P EST MOD 30 MIN: CPT | Mod: S$PBB,,, | Performed by: NURSE PRACTITIONER

## 2022-06-23 PROCEDURE — 99214 OFFICE O/P EST MOD 30 MIN: CPT | Mod: PBBFAC | Performed by: NURSE PRACTITIONER

## 2022-06-23 RX ORDER — AMLODIPINE BESYLATE 10 MG/1
10 TABLET ORAL
COMMUNITY
Start: 2021-11-22 | End: 2022-06-23 | Stop reason: SDUPTHER

## 2022-06-23 RX ORDER — AMLODIPINE BESYLATE 10 MG/1
10 TABLET ORAL DAILY
Qty: 90 TABLET | Refills: 1 | Status: SHIPPED | OUTPATIENT
Start: 2022-06-23 | End: 2022-12-22 | Stop reason: SDUPTHER

## 2022-06-23 RX ORDER — ALBUTEROL SULFATE 90 UG/1
1-2 AEROSOL, METERED RESPIRATORY (INHALATION) EVERY 6 HOURS PRN
Qty: 18 G | Refills: 1 | Status: SHIPPED | OUTPATIENT
Start: 2022-06-23 | End: 2023-06-20 | Stop reason: SDUPTHER

## 2022-06-23 NOTE — ASSESSMENT & PLAN NOTE
"KRUEGER remains, reports slightly worse than previous. Used to become dyspneic with ambulating long distances. Now states, "I don't even have to go that far." Pt has had an unremarkable cardiac work-up. She was last seen by Cards in March. Scheduled to f/u around September. She had a negative CXR in March 2021. Never had a CT thorax.   SpO2 99% on RA  "

## 2022-06-23 NOTE — ASSESSMENT & PLAN NOTE
Patient as prescribed Pantoprazole per GI around March but can't tolerate. Takes Tagamet with minimal relief  States was told repeat surgery would likely not be effective  1. Drink 6-8 glasses of water a day  2. Avoid triggers (i.e., spicy foods, caffeine, chocolate, fatty foods)  3. No smoking  4. Elevate head of bed at night if needed  5. Avoid eating large meals 2-3 hours before bedtime  6. Antacids as needed

## 2022-06-23 NOTE — PROGRESS NOTES
"  CHELSEA Morales   [facility]  [address] [facility] [id number]      PATIENT NAME: Martha Merino  : 1960  DATE: 22  MRN: [id number]      Billing Provider: CHELSEA Morales  Level of Service:   Patient PCP Information     Provider PCP Type    CHELSEA Butterfield [facility]          Reason for Visit / Chief Complaint: Follow-up (Lab review)       History of Present Illness / Problem Focused Workflow     Martha Merino presents to the clinic with Follow-up (Lab review)     Initial Visit (18): 58 y.o.  female presenting to the clinic to re-establish primary care. Previous PCP JR Ge NP. Last OV 18. PMHx significant for HTN. Bp at goal. Taking Amlodipine 10 mg po daily. Today, pt c/o cough, chest congestion, and chest soreness x 2+ weeks. Cough is nonproductive. Denies fever, chills, sore throat, or ear pain. Associated symptoms include HA. Denies SOB at present or wheezing. Not taking any medications for cough. No other problems stated.    (19): 58 y.o.  female with PMHx significant for HTN, presenting for routine f/u and lab results. Bp at goal. Taking Amlodipine 10 mg po daily. Alk phos elevated, although improved from prior assessments. Pt with hx of cholecystectomy. She denies any abdominal complaints. UA also revealed increased WBCs and leuk esterase. No bacteria. Pt denies dysuria, urinary urgency, urinary frequency, malodorous urine, abd pain, or any urinary complaints. Denies CP or SOB. No other problems stated.   Mammo scheduled 19    (10/28/19): Pt presenting with c/o depressive symptoms. She reports a hx of depression. States, "Sometimes I wake up and don't feel like doing nothing!" She was prescribed an antidepressant in the past by a physician in [place], [place], but stopped because she didn't like how it made her feel. She denies SI/HI or any past thoughts/plans. States, "Maybe " "it's because my man is always at home...sometimes we need a break." She ultimately revealed that she was worried ever since a recent abd US revealed that she had a fatty liver. She thought something was "wrong." She does report that she's cut out some fats and sweets from her diet and has lost 4lbs since her last OV. She denies fever, chills, weakness, dizziness, chest pain, SOB, abd pain, leg pain, B/B incontinence of any other concerns today.    (12/30/19): 59 y.o.  female with PMHx significant for HTN, depression, osteopenia, and GERD presenting for f/u. She was unable to tolerate po calcium supplements after being diagnosed with osteopenia per DXA. However, she states that she is able to take 600 mg of calcium carbonate po but cannot tolerate a higher dose due to GI SE. She's also compliant with OTC Vitamin D. Bp at goal. Taking Amlodipine 10 mg po daily. Tolerating well. Renal indices stable and GFR overall improved from previous. Alk phos remains slightly elevated but stable. CBC and UA unremarkable. FIT + from 07/2019. Pt is still waiting on appt in GI lab. She has a hx of GERD s/p Nissen Fundoplication (6/29/16). She continues with intermittent heart burn and reflux of contents. She cannot take PPIs due to severe allergy. States that she had previously been informed that she could only take Jeanie-Ven[place] or Maalox. She does report that these OTC preparations relieve her symptoms, although, they do not entirely go away. When discussing need to consider EGD, pt responded, "I already had that" and was sure to state that she was not interested in a repeat EGD at this time. She denies fever, chills, weakness, dizziness, HAs, chest pain, SOB, cough, palpitations, abd pain, leg pain, swelling, B/B incontinence/dysfunction or any other concerns today.    (2/4/2020): 59 y.o.  female with PMHx significant for HTN, depression, osteopenia, and GERD presenting for ED f/u. She presented to ED on 1/3/2020 " "with c/o "squeezing" chest pain. EKG revealed NSR, with probable anterior infarct, age undetermined. Cardiac enzymes were unremarkable. CXR was negative. She was diagnosed with CP due to GERD, pyrosis. She was given a prescription for Pepcid but states that medication was largely ineffective. She cannot tolerate PPIs due to an allergy. She has no c/o chest pain at present. She is also c/o a dry cough that started "a few days ago." She denies any associated symptoms. Denies fever and is afebrile today. She denies any travel outside of the country and denies exposure to any sick contacts within the last 2 weeks. No other problems stated.    (6/23/2020): 59 y.o.  female with PMHx significant for HTN, depression, osteopenia, and GERD/s/p Nissen presenting for routine f/u. Pt referred to GI lab for EGD at last visit. Awaiting appt. H. Pylori was ordered following last OV but not completed. Pt had an H. Pylori conducted in 2015 that was negative. TST 5/25. Referred to cards. Summary below. Pt states, "I had a stress test that I didn't do so well on in the past but they always told me it was because of my reflux." Pt is not taking anything for reflux at this time. She is allergic to PPIs. She completed Famotidine. Takes Maalox as needed. States no issues with reflux over the past several weeks. Alk phos trending down (lowest since 2016). Mammo due in September. H/o + FIT. Referred to GI clinic for screening colonoscopy 7/2019. No appt to date. No acute concerns.    Treadmill stress test conducted 5/25/2020 for c/o chest pain-   1. Fair chronotrophic response.  2. Fair to poor prognosis treadmill stress test.  3. Fair to poor exercise tolerance. Patient is 59 years old, and exercised for 4:12 min:sec on Mendez Protocol.   4. Achieved 83% of maximum predicted heart rate.   5. Did not reach target heart rate.   6. No significant EKG changes on the stress EKG.   7. Patient had no chest pain during the treadmill stress " "test  8. Camacho treadmill score indicate moderate risk    (12/23/2020): 60 y.o.  female with PMHx significant for HTN, depression, osteopenia, and GERD/s/p Nissen presenting for routine f/u. H/o + FIT. Referred to GI clinic for screening colonoscopy 7/2019. She was contacted by GI lab 8/2020 and ultimately declined the procedure. She also declines repeating FIT test at this time. States, "It's been doing good so I don't think I need it." Denies changes in stools/bowel habits or bloody stools. Has not been seen in Cards d/t abnl TST 5/25/20. Mammo 9/2020 negative. Seen in GYN for annual 10/2020. Declines vaccines today. Reports left ear was hurting yesterday. No pain today, just wants ear checked. States, "Maybe it was the wind." Asked about getting ears wet and she replied, "I could have." CMP was ordered but pt did not complete. No other concerns.    (3/23/2021): 60 y.o.  female with PMHx significant for HTN, depression, osteopenia, and GERD/s/p Nissen 2016 presenting for ED f/u. She was seen in ED 2x this month with c/o CP, later diagnosed at GERD. Believes it was induced by intake of fried foods. She was referred to GI lab 2/2020 for GERD. Declined this is the past. She was prescribed Tagamet during ED visit on 3/9/21. States ineffective, although she feels "a lot better" than during ED visit. She is following Cards for CP. She underwent a stress Echo 2/26/21 that was: Negative for ischemia. She is scheduled for an Echo 4/20/21. H/o + FIT. Referred to GI clinic for screening colonoscopy 7/2019. She was contacted by GI lab 8/2020 and ultimately declined the procedure. She also declines repeating FIT test at this time. States, "It's been doing good so I don't think I need it." Denies changes in stools, changes in bowel habits, appearance of stools (i.e., no pencil-like or stringy stools) or bloody stools. Denies CP or SOB. No other concerns at present.    (6/22/2021): 60 y.o.  female with " "PMHx significant for HTN, depression, osteopenia, and GERD/s/p Nissen 2016 presenting for f/u. She was seen in Cards 5/21/21 for f/u KRUEGER. Echo 4/2021 revealed an EF ~50-55%; stress Echo 2/26/21 that was: Negative for ischemia. Reported that KRUEGER had improved. Today, she reports KRUEGER essentially resolved. Bp at goal. Compliant with Amlodipine. She had a televisit with GI 3/30/21 due to chronic GERD. Scheduled for EGD 8/30/2021. Not taking any antacids. States Tagamet and Carafate caused HAs. Labs reviewed and unremarkable. Mammo due 9/2021. Declines vaccines. Continues to decline colonoscopy, but is amenable to repeating FIT. No acute concerns.    Today's Visit (12/23/2021): 61y.o.  female with PMHx significant for HTN, depression, osteopenia, and GERD/s/p Nissen 2016 presenting for f/u. She continues to follow Cards. Bp at goal. Compliant with Amlodipine. She was scheduled for EGD 8/30/2021, but appt was pushed back to March 2022. Pt reports "I'm fine," and okay with waiting for appt. Labs reviewed and unremarkable compared to personal baseline. Of note, pt was diagnosed with COVID-19 in July 2021, s/p mAb. She also reports that she received the second COVID vaccine last month. She is c/o right ear pain, "like a toothache," off and on since last visit. Denies fever, chills, or drainage. Also c/o intermittent right knee ache, pop, and swelling managed with elevation and ice packs. Denies falls or overt injuries. No other concerns.    Health Maintenance:   Colon Ca Screening-FIT negative 7/2021   Breast Ca Screening-Mammogram negative 10/2021   Cervical Ca screening- Pap smear 4/3/2019 nil, High-risk HPV negative    TV 6/23/22: Patient presenting for routine f/u. She is now s/p EGD on 3/23/22 that revealed mild, chronic gastritis. Also revealed loose Nissen, however, patient told she's not likely a candidate for repeat surgery. She was prescribed Pantoprazole but unable to tolerate PPIs. States takes Tagamet " "with minimal relief. KRUEGER remains; reports slightly worse than previous. Used to become dyspneic with ambulating long distances. Now states, "I don't even have to go that far." Pt has had an unremarkable cardiac work-up. Last seen in [facility] 3/2022. She had a negative CXR 3/2021. She's never had a CT of her lungs. Nonsmoker. Labs reviewed with patient and overall unremarkable besides a slight elevation in HCT and MCV. She is requesting a completion of the medical examiner's certification of mobility impairment due to cannot ambulate > 200ft w/o having to stop to rest. She denies any other concerns today.      Review of Systems     Review of Systems   Constitutional: Negative.    HENT: Negative.    Eyes: Negative.    Respiratory: Positive for shortness of breath.    Cardiovascular: Negative.    Gastrointestinal: Negative.    Endocrine: Negative.    Genitourinary: Negative.    Musculoskeletal: Negative.    Skin: Negative.    Allergic/Immunologic: Negative.    Neurological: Negative.    Hematological: Negative.    Psychiatric/Behavioral: Negative.        Medical / Social / Family History   History reviewed. No pertinent past medical history.    Past Surgical History:   Procedure Laterality Date    BIOPSY      Gastrointestinal    CHOLECYSTECTOMY      ESOPHAGOGASTRODUODENOSCOPY  03/23/2022    HERNIA REPAIR      LAPAROSCOPIC NISSEN FUNDOPLICATION  06/29/2016    TUBAL LIGATION         Social History  Ms.  reports that she has never smoked. She has never used smokeless tobacco. She reports that she does not drink alcohol and does not use drugs.    Family History  Ms.'s family history includes COPD in her sister; Cancer in her father; Hypertension in her mother and sister.    Medications and Allergies     Medications  Medication List with Changes/Refills   New Medications    ALBUTEROL (PROVENTIL HFA) 90 MCG/ACTUATION INHALER    Inhale 1-2 puffs into the lungs every 6 (six) hours as needed for Wheezing or Shortness of " Breath. Rescue   Changed and/or Refilled Medications    Modified Medication Previous Medication    AMLODIPINE (NORVASC) 10 MG TABLET amLODIPine (NORVASC) 10 MG tablet       Take 1 tablet (10 mg total) by mouth once daily.    Take 10 mg by mouth.       Allergies  Review of patient's allergies indicates:   Allergen Reactions    Esomeprazole Shortness Of Breath    Omeprazole magnesium Shortness Of Breath    Promethazine-phenyleph-codeine      Other reaction(s): patient does not recall reaction       Physical Examination     Vitals:    06/23/22 0802   BP: 134/78   Pulse: 71   Resp: 20   Temp: 97.6 °F (36.4 °C)     Physical Exam  Constitutional:       Appearance: Normal appearance.   HENT:      Head: Normocephalic and atraumatic.      Right Ear: Tympanic membrane, ear canal and external ear normal.      Left Ear: Tympanic membrane, ear canal and external ear normal.      Nose: Nose normal.      Mouth/Throat:      Mouth: Mucous membranes are moist.      Dentition: Has dentures.      Pharynx: Oropharynx is clear.   Eyes:      Extraocular Movements: Extraocular movements intact.      Conjunctiva/sclera: Conjunctivae normal.      Pupils: Pupils are equal, round, and reactive to light.   Cardiovascular:      Rate and Rhythm: Normal rate and regular rhythm.      Pulses: Normal pulses.      Heart sounds: Normal heart sounds.   Pulmonary:      Effort: Pulmonary effort is normal.      Breath sounds: Normal breath sounds.   Abdominal:      General: Bowel sounds are normal. There is no distension.      Palpations: Abdomen is soft. There is no mass.      Tenderness: There is no abdominal tenderness. There is no right CVA tenderness, left CVA tenderness, guarding or rebound.      Hernia: No hernia is present.   Musculoskeletal:         General: Normal range of motion.      Cervical back: Normal range of motion and neck supple.   Skin:     General: Skin is warm and dry.   Neurological:      General: No focal deficit present.       Mental Status: She is alert and oriented to person, place, and time.   Psychiatric:         Mood and Affect: Mood normal.         Behavior: Behavior normal.         Thought Content: Thought content normal.         Judgment: Judgment normal.           Results     Lab Results   Component Value Date    WBC 5.8 06/20/2022    RBC 5.06 06/20/2022    HGB 15.3 06/20/2022    HCT 50.1 (H) 06/20/2022    MCV 99.0 (H) 06/20/2022    MCH 30.2 06/20/2022    MCHC 30.5 (L) 06/20/2022    RDW 13.1 06/20/2022     06/20/2022    MPV 10.2 06/20/2022     CMP  Sodium Level   Date Value Ref Range Status   06/20/2022 143 136 - 145 mmol/L Final     Potassium Level   Date Value Ref Range Status   06/20/2022 4.6 3.5 - 5.1 mmol/L Final     Carbon Dioxide   Date Value Ref Range Status   06/20/2022 28 23 - 31 mmol/L Final     Blood Urea Nitrogen   Date Value Ref Range Status   06/20/2022 15.0 9.8 - 20.1 mg/dL Final     Creatinine   Date Value Ref Range Status   06/20/2022 0.95 0.55 - 1.02 mg/dL Final     Calcium Level Total   Date Value Ref Range Status   06/20/2022 10.1 8.4 - 10.2 mg/dL Final     Albumin Level   Date Value Ref Range Status   06/20/2022 4.3 3.4 - 4.8 gm/dL Final     Bilirubin Total   Date Value Ref Range Status   06/20/2022 0.9 <=1.5 mg/dL Final     Alkaline Phosphatase   Date Value Ref Range Status   06/20/2022 135 40 - 150 unit/L Final     Aspartate Aminotransferase   Date Value Ref Range Status   06/20/2022 15 5 - 34 unit/L Final     Alanine Aminotransferase   Date Value Ref Range Status   06/20/2022 15 0 - 55 unit/L Final     Estimated GFR-Non    Date Value Ref Range Status   06/20/2022 >60 mls/min/1.73/m2 Final     Lab Results   Component Value Date    CHOL 169 06/20/2022     Lab Results   Component Value Date    HDL 42 06/20/2022     No results found for: LDLCALC  Lab Results   Component Value Date    TRIG 105 06/20/2022     No results found for: CHOLHDL  Lab Results   Component Value Date    TSH 4.1120  "06/20/2022     Lab Results   Component Value Date    PHUR 8.5 (H) 06/22/2021    PROTEINUA Negative 06/20/2022    GLUCUA Negative 06/22/2021    KETONESU Negative 06/22/2021    OCCULTUA Negative 06/22/2021    NITRITE Negative 06/22/2021    LEUKOCYTESUR Negative 06/20/2022           Assessment and Plan (including Health Maintenance)     Plan:         Health Maintenance Due   Topic Date Due    COVID-19 Vaccine (3 - Booster for Pfizer series) 04/15/2022       Problem List Items Addressed This Visit        Cardiac/Vascular    Hypertension    Overview     Amlodipine 10 mg po daily           Current Assessment & Plan     BP at goal  Continue current regimen  DASH           Relevant Medications    amLODIPine (NORVASC) 10 MG tablet    KRUEGER (dyspnea on exertion)    Overview     Work-up has included:  Echocardiogram on April 20, 2021 which revealed an ejection fraction of approximately 50 to 55% with trace pericardial effusion without hemodynamic significance. She completed an Exercise Stress echocardiogram on February 26, 2021 which was negative for ischemia.   CXR 3/17/21: negative             Current Assessment & Plan     KRUEGER remains, reports slightly worse than previous. Used to become dyspneic with ambulating long distances. Now states, "I don't even have to go that far." Pt has had an unremarkable cardiac work-up. She was last seen by Cards in March. Scheduled to f/u around September. She had a negative CXR in March 2021. Never had a CT thorax.   SpO2 99% on RA           Relevant Medications    albuterol (PROVENTIL HFA) 90 mcg/actuation inhaler    Other Relevant Orders    CT Chest Without Contrast       Oncology    Elevated hematocrit    Current Assessment & Plan     Repeat CBC-D at f/u to monitor. Non-smoker  Obtaining CT thorax to further eval KRUEGER              Endocrine    Obesity    Current Assessment & Plan     Educated on aerobic exercise for 30 mins/day, at least 5 days per week. Low-fat diet                GI    " Gastroesophageal reflux disease without esophagitis    Overview     S/p EGD 3/23/22: - 2 cm hiatal hernia.       - A Nissen fundoplication was found. The wrap appears loose and not  intact.       - Erythematous mucosa in the stomach. Biopsied. (path mild chronic gastritis)       - Normal examined duodenum.             Current Assessment & Plan     Patient as prescribed Pantoprazole per GI around March but can't tolerate. Takes Tagamet with minimal relief  States was told repeat surgery would likely not be effective  1. Drink 6-8 glasses of water a day  2. Avoid triggers (i.e., spicy foods, caffeine, chocolate, fatty foods)  3. No smoking  4. Elevate head of bed at night if needed  5. Avoid eating large meals 2-3 hours before bedtime  6. Antacids as needed                Other    Wellness examination - Primary    Overview     Health Maintenance:   Colon Ca Screening-FIT negative 7/2021   Breast Ca Screening-Mammogram negative 10/2021   Cervical Ca screening- Pap smear 4/3/2019 nil, High-risk HPV negative           Relevant Orders    CBC Auto Differential    Comprehensive Metabolic Panel    Urinalysis, Reflex to Urine Culture Urine, Clean Catch      Other Visit Diagnoses     Screening for colon cancer        Relevant Orders    OCCULT BLOOD FECAL IMMUNOASSAY    Encounter for screening for malignant neoplasm of breast, unspecified screening modality        Relevant Orders    Mammo Digital Screening Bilat w/ Gustavo          Health Maintenance Topics with due status: Not Due       Topic Last Completion Date    Mammogram 10/05/2021    Cervical Cancer Screening 04/05/2022    Lipid Panel 06/20/2022    Influenza Vaccine Not Due    Colorectal Cancer Screening Not Due       Future Appointments   Date Time Provider Department Center   8/23/2022  8:50 AM [facility]YN Zack Mcguire   3/31/2023  7:30 AM [facility] Pepin Un            Signature:  CHELSEA Morales  [facility]  [address] [facility] [id number]    Date of encounter: 6/23/22

## 2022-07-03 LAB
FLUAV AG UPPER RESP QL IA.RAPID: NOT DETECTED
FLUBV AG UPPER RESP QL IA.RAPID: NOT DETECTED
SARS-COV-2 RNA RESP QL NAA+PROBE: DETECTED

## 2022-07-03 PROCEDURE — 99283 EMERGENCY DEPT VISIT LOW MDM: CPT | Mod: 25

## 2022-07-03 PROCEDURE — 87636 SARSCOV2 & INF A&B AMP PRB: CPT | Performed by: PHYSICIAN ASSISTANT

## 2022-07-04 ENCOUNTER — HOSPITAL ENCOUNTER (EMERGENCY)
Facility: HOSPITAL | Age: 62
Discharge: HOME | End: 2022-07-04
Attending: STUDENT IN AN ORGANIZED HEALTH CARE EDUCATION/TRAINING PROGRAM

## 2022-07-04 VITALS
BODY MASS INDEX: 32.25 KG/M2 | HEART RATE: 94 BPM | WEIGHT: 193.56 LBS | RESPIRATION RATE: 18 BRPM | SYSTOLIC BLOOD PRESSURE: 145 MMHG | OXYGEN SATURATION: 98 % | DIASTOLIC BLOOD PRESSURE: 76 MMHG | HEIGHT: 65 IN | TEMPERATURE: 98 F

## 2022-07-04 DIAGNOSIS — U07.1 COVID: ICD-10-CM

## 2022-07-04 DIAGNOSIS — R05.9 COUGH: Primary | ICD-10-CM

## 2022-07-04 RX ORDER — DICLOFENAC SODIUM 75 MG/1
75 TABLET, DELAYED RELEASE ORAL 2 TIMES DAILY
Qty: 30 TABLET | Refills: 0 | Status: SHIPPED | OUTPATIENT
Start: 2022-07-04 | End: 2022-12-22

## 2022-07-04 NOTE — ED PROVIDER NOTES
Encounter Date: 7/3/2022       History     Chief Complaint   Patient presents with    Cough     Pt arrives with c/o dry cough x 1 day     61-year-old female with past medical history of COPD presenting today with cough and sore throat for 1 day.  She states that for the past day she started having a sore throat and a nonproductive cough.  She then came to the emergency department for evaluation.    The history is provided by the patient. No  was used.   General Illness   The current episode started yesterday. The problem occurs continuously. The problem has been unchanged. Nothing relieves the symptoms. Nothing aggravates the symptoms. Associated symptoms include sore throat and cough. Pertinent negatives include no fever, no abdominal pain, no diarrhea, no nausea, no vomiting, no congestion, no headaches, no shortness of breath, no wheezing, no discharge, no pain and no eye redness.     Review of patient's allergies indicates:   Allergen Reactions    Esomeprazole Shortness Of Breath    Omeprazole magnesium Shortness Of Breath    Codeine     Promethazine-phenyleph-codeine      Other reaction(s): patient does not recall reaction     History reviewed. No pertinent past medical history.  Past Surgical History:   Procedure Laterality Date    BIOPSY      Gastrointestinal    CHOLECYSTECTOMY      ESOPHAGOGASTRODUODENOSCOPY  03/23/2022    HERNIA REPAIR      LAPAROSCOPIC NISSEN FUNDOPLICATION  06/29/2016    TUBAL LIGATION       Family History   Problem Relation Age of Onset    Hypertension Mother     Cancer Father     Hypertension Sister     COPD Sister      Social History     Tobacco Use    Smoking status: Never Smoker    Smokeless tobacco: Never Used   Substance Use Topics    Alcohol use: Never    Drug use: Never     Review of Systems   Constitutional: Negative for activity change, chills and fever.   HENT: Positive for sore throat. Negative for congestion and facial swelling.     Eyes: Negative for pain, discharge and redness.   Respiratory: Positive for cough. Negative for shortness of breath and wheezing.    Cardiovascular: Negative for chest pain and leg swelling.   Gastrointestinal: Negative for abdominal pain, diarrhea, nausea and vomiting.   Genitourinary: Negative for difficulty urinating, vaginal bleeding and vaginal discharge.   Musculoskeletal: Negative for gait problem and neck stiffness.   Skin: Negative for color change and pallor.   Neurological: Negative for dizziness and headaches.       Physical Exam     Initial Vitals [07/03/22 2107]   BP Pulse Resp Temp SpO2   133/72 92 18 98.2 °F (36.8 °C) 99 %      MAP       --         Physical Exam    Nursing note and vitals reviewed.  Constitutional: She appears well-developed. She is not diaphoretic. No distress.   HENT:   Head: Normocephalic and atraumatic.   Eyes: Conjunctivae and EOM are normal. Right eye exhibits no discharge. Left eye exhibits no discharge.   Neck: Neck supple. No tracheal deviation present.   Normal range of motion.  Cardiovascular: Normal rate and regular rhythm.   Pulmonary/Chest: No respiratory distress.   Abdominal: Abdomen is soft. She exhibits no distension. There is no abdominal tenderness.   Musculoskeletal:         General: Normal range of motion.      Cervical back: Normal range of motion and neck supple.     Neurological: She is alert and oriented to person, place, and time. She has normal strength.   Skin: Skin is warm and dry.         ED Course   Procedures  Labs Reviewed   COVID/FLU A&B PCR - Abnormal; Notable for the following components:       Result Value    SARS-CoV-2 PCR Detected (*)     All other components within normal limits          Imaging Results    None          Medications - No data to display  Medical Decision Making:   ED Management:  Pt presents to the ED with complaints of cough, cold, congestion     Differential considerations include: influenza, bronchitis, viral URI,  COVID  Evaluated the patient emergency department.  She was stable well-appearing.  She complained of a cough and sore throat for 1 day.  Her COVID test was positive.  She is instructed on symptomatic treatment and is being discharged home symptomatic treatment. She was instructed to follow up with her PCP and she verbalized understanding.  She is being discharged home stable condition at this time.                      Clinical Impression:   Final diagnoses:  [R05.9] Cough (Primary)  [U07.1] COVID          ED Disposition Condition    Discharge Stable        ED Prescriptions     Medication Sig Dispense Start Date End Date Auth. Provider    diclofenac (VOLTAREN) 75 MG EC tablet Take 1 tablet (75 mg total) by mouth 2 (two) times daily. 30 tablet 7/4/2022  Tim Mcmillan MD        Follow-up Information     Follow up With Specialties Details Why Contact Info    [facility] Call in 3 days  [address] [facility] [id number] [phone]             Tim Mcmillan MD  07/04/22 0004

## 2022-09-26 PROBLEM — Z00.00 WELLNESS EXAMINATION: Status: RESOLVED | Noted: 2022-06-22 | Resolved: 2022-09-26

## 2022-12-09 ENCOUNTER — HOSPITAL ENCOUNTER (OUTPATIENT)
Dept: RADIOLOGY | Facility: HOSPITAL | Age: 62
Discharge: HOME | End: 2022-12-09
Attending: NURSE PRACTITIONER

## 2022-12-09 DIAGNOSIS — Z12.39 ENCOUNTER FOR SCREENING FOR MALIGNANT NEOPLASM OF BREAST, UNSPECIFIED SCREENING MODALITY: ICD-10-CM

## 2022-12-09 PROCEDURE — 77067 SCR MAMMO BI INCL CAD: CPT | Mod: TC

## 2022-12-09 PROCEDURE — 77067 SCR MAMMO BI INCL CAD: CPT | Mod: 26,,, | Performed by: RADIOLOGY

## 2022-12-09 PROCEDURE — 77063 MAMMO DIGITAL SCREENING BILAT WITH TOMO: ICD-10-PCS | Mod: 26,,, | Performed by: RADIOLOGY

## 2022-12-09 PROCEDURE — 77067 MAMMO DIGITAL SCREENING BILAT WITH TOMO: ICD-10-PCS | Mod: 26,,, | Performed by: RADIOLOGY

## 2022-12-09 PROCEDURE — 77063 BREAST TOMOSYNTHESIS BI: CPT | Mod: 26,,, | Performed by: RADIOLOGY

## 2022-12-12 ENCOUNTER — TELEPHONE (OUTPATIENT)
Dept: INTERNAL MEDICINE | Facility: CLINIC | Age: 62
End: 2022-12-12

## 2022-12-16 ENCOUNTER — LAB VISIT (OUTPATIENT)
Dept: LAB | Facility: HOSPITAL | Age: 62
End: 2022-12-16
Attending: NURSE PRACTITIONER

## 2022-12-16 DIAGNOSIS — Z00.00 WELLNESS EXAMINATION: ICD-10-CM

## 2022-12-16 LAB
ALBUMIN SERPL-MCNC: 4 G/DL (ref 3.4–4.8)
ALBUMIN/GLOB SERPL: 1.1 RATIO (ref 1.1–2)
ALP SERPL-CCNC: 133 UNIT/L (ref 40–150)
ALT SERPL-CCNC: 13 UNIT/L (ref 0–55)
APPEARANCE UR: CLEAR
AST SERPL-CCNC: 12 UNIT/L (ref 5–34)
BACTERIA #/AREA URNS AUTO: ABNORMAL /HPF
BASOPHILS # BLD AUTO: 0.01 X10(3)/MCL (ref 0–0.2)
BASOPHILS NFR BLD AUTO: 0.1 %
BILIRUB UR QL STRIP.AUTO: NEGATIVE MG/DL
BILIRUBIN DIRECT+TOT PNL SERPL-MCNC: 0.6 MG/DL
BUN SERPL-MCNC: 16.1 MG/DL (ref 9.8–20.1)
CALCIUM SERPL-MCNC: 10.2 MG/DL (ref 8.4–10.2)
CHLORIDE SERPL-SCNC: 106 MMOL/L (ref 98–107)
CO2 SERPL-SCNC: 26 MMOL/L (ref 23–31)
COLOR UR AUTO: COLORLESS
CREAT SERPL-MCNC: 1.08 MG/DL (ref 0.55–1.02)
EOSINOPHIL # BLD AUTO: 0.07 X10(3)/MCL (ref 0–0.9)
EOSINOPHIL NFR BLD AUTO: 1 %
ERYTHROCYTE [DISTWIDTH] IN BLOOD BY AUTOMATED COUNT: 12.9 % (ref 11–14.5)
GFR SERPLBLD CREATININE-BSD FMLA CKD-EPI: 59 MLS/MIN/1.73/M2
GLOBULIN SER-MCNC: 3.5 GM/DL (ref 2.4–3.5)
GLUCOSE SERPL-MCNC: 91 MG/DL (ref 82–115)
GLUCOSE UR QL STRIP.AUTO: NORMAL MG/DL
HCT VFR BLD AUTO: 49.4 % (ref 37–47)
HGB BLD-MCNC: 15.5 GM/DL (ref 12–16)
HYALINE CASTS #/AREA URNS LPF: ABNORMAL /LPF
IMM GRANULOCYTES # BLD AUTO: 0.02 X10(3)/MCL (ref 0–0.04)
IMM GRANULOCYTES NFR BLD AUTO: 0.3 %
KETONES UR QL STRIP.AUTO: NEGATIVE MG/DL
LEUKOCYTE ESTERASE UR QL STRIP.AUTO: NEGATIVE UNIT/L
LYMPHOCYTES # BLD AUTO: 2.03 X10(3)/MCL (ref 0.6–4.6)
LYMPHOCYTES NFR BLD AUTO: 30.2 %
MCH RBC QN AUTO: 30.6 PG
MCHC RBC AUTO-ENTMCNC: 31.4 MG/DL (ref 33–36)
MCV RBC AUTO: 97.6 FL (ref 80–94)
MONOCYTES # BLD AUTO: 0.56 X10(3)/MCL (ref 0.1–1.3)
MONOCYTES NFR BLD AUTO: 8.3 %
MUCOUS THREADS URNS QL MICRO: ABNORMAL /LPF
NEUTROPHILS # BLD AUTO: 4.03 X10(3)/MCL (ref 2.1–9.2)
NEUTROPHILS NFR BLD AUTO: 60.1 %
NITRITE UR QL STRIP.AUTO: NEGATIVE
NRBC BLD AUTO-RTO: 0 % (ref 0–1)
PH UR STRIP.AUTO: 8 [PH]
PLATELET # BLD AUTO: 371 X10(3)/MCL (ref 140–371)
PMV BLD AUTO: 10.6 FL (ref 9.4–12.4)
POTASSIUM SERPL-SCNC: 4.8 MMOL/L (ref 3.5–5.1)
PROT SERPL-MCNC: 7.5 GM/DL (ref 5.8–7.6)
PROT UR QL STRIP.AUTO: NEGATIVE MG/DL
RBC # BLD AUTO: 5.06 X10(6)/MCL (ref 4.2–5.4)
RBC #/AREA URNS AUTO: ABNORMAL /HPF
RBC UR QL AUTO: NEGATIVE UNIT/L
SODIUM SERPL-SCNC: 142 MMOL/L (ref 136–145)
SP GR UR STRIP.AUTO: 1.01
SQUAMOUS #/AREA URNS LPF: ABNORMAL /HPF
UROBILINOGEN UR STRIP-ACNC: NORMAL MG/DL
WAXY CASTS #/AREA URNS LPF: ABNORMAL /LPF
WBC # SPEC AUTO: 6.7 X10(3)/MCL (ref 4.5–11.5)
WBC #/AREA URNS AUTO: ABNORMAL /HPF

## 2022-12-16 PROCEDURE — 80053 COMPREHEN METABOLIC PANEL: CPT

## 2022-12-16 PROCEDURE — 36415 COLL VENOUS BLD VENIPUNCTURE: CPT

## 2022-12-16 PROCEDURE — 85025 COMPLETE CBC W/AUTO DIFF WBC: CPT

## 2022-12-16 PROCEDURE — 81001 URINALYSIS AUTO W/SCOPE: CPT

## 2022-12-22 ENCOUNTER — OFFICE VISIT (OUTPATIENT)
Dept: INTERNAL MEDICINE | Facility: CLINIC | Age: 62
End: 2022-12-22

## 2022-12-22 VITALS
HEART RATE: 65 BPM | HEIGHT: 65 IN | WEIGHT: 196.63 LBS | RESPIRATION RATE: 20 BRPM | SYSTOLIC BLOOD PRESSURE: 124 MMHG | DIASTOLIC BLOOD PRESSURE: 78 MMHG | BODY MASS INDEX: 32.76 KG/M2 | TEMPERATURE: 98 F

## 2022-12-22 DIAGNOSIS — R06.09 DOE (DYSPNEA ON EXERTION): ICD-10-CM

## 2022-12-22 DIAGNOSIS — I10 HYPERTENSION, UNSPECIFIED TYPE: ICD-10-CM

## 2022-12-22 DIAGNOSIS — Z12.11 SCREENING FOR COLON CANCER: Primary | ICD-10-CM

## 2022-12-22 DIAGNOSIS — Z00.00 WELLNESS EXAMINATION: ICD-10-CM

## 2022-12-22 DIAGNOSIS — M19.90 OSTEOARTHRITIS, UNSPECIFIED OSTEOARTHRITIS TYPE, UNSPECIFIED SITE: ICD-10-CM

## 2022-12-22 DIAGNOSIS — R71.8 ELEVATED HEMATOCRIT: ICD-10-CM

## 2022-12-22 DIAGNOSIS — Z13.1 SCREENING FOR DIABETES MELLITUS: ICD-10-CM

## 2022-12-22 PROCEDURE — 99214 OFFICE O/P EST MOD 30 MIN: CPT | Mod: PBBFAC | Performed by: NURSE PRACTITIONER

## 2022-12-22 PROCEDURE — 99214 PR OFFICE/OUTPT VISIT, EST, LEVL IV, 30-39 MIN: ICD-10-PCS | Mod: S$PBB,,, | Performed by: NURSE PRACTITIONER

## 2022-12-22 PROCEDURE — 99214 OFFICE O/P EST MOD 30 MIN: CPT | Mod: S$PBB,,, | Performed by: NURSE PRACTITIONER

## 2022-12-22 RX ORDER — PNV NO.95/FERROUS FUM/FOLIC AC 28MG-0.8MG
100 TABLET ORAL DAILY
COMMUNITY
End: 2023-03-14

## 2022-12-22 RX ORDER — AMLODIPINE BESYLATE 10 MG/1
10 TABLET ORAL DAILY
Qty: 90 TABLET | Refills: 1 | Status: SHIPPED | OUTPATIENT
Start: 2022-12-22 | End: 2023-02-14 | Stop reason: SDUPTHER

## 2022-12-22 RX ORDER — DICLOFENAC SODIUM 10 MG/G
2 GEL TOPICAL 4 TIMES DAILY PRN
Qty: 100 G | Refills: 0 | Status: SHIPPED | OUTPATIENT
Start: 2022-12-22 | End: 2023-06-20 | Stop reason: SDUPTHER

## 2022-12-22 RX ORDER — FERROUS SULFATE, DRIED 160(50) MG
1 TABLET, EXTENDED RELEASE ORAL DAILY
COMMUNITY

## 2022-12-22 NOTE — ASSESSMENT & PLAN NOTE
No complaints today  See unremarkable work-up above  Declines CT 2/2 cost. Will monitor  Sx likely r/t GERD--unable to tolerate PPI 2/2 allergy

## 2022-12-22 NOTE — PROGRESS NOTES
"  CHELSEA Morales   [facility]  [address] [facility] [address]      PATIENT NAME: Martha Merino  : 1960  DATE: 22  MRN: [id number]      Billing Provider: CHELSEA Morales  Level of Service:   Patient PCP Information       Provider PCP Type    [facility]            Reason for Visit / Chief Complaint: Follow-up and Medication Refill       History of Present Illness / Problem Focused Workflow     Martha Merino presents to the clinic with Follow-up and Medication Refill     Initial Visit (18): 58 y.o.  female presenting to the clinic to re-establish primary care. Previous PCP JR Ge NP. Last OV 18. PMHx significant for HTN. Bp at goal. Taking Amlodipine 10 mg po daily. Today, pt c/o cough, chest congestion, and chest soreness x 2+ weeks. Cough is nonproductive. Denies fever, chills, sore throat, or ear pain. Associated symptoms include HA. Denies SOB at present or wheezing. Not taking any medications for cough. No other problems stated.    (19): 58 y.o.  female with PMHx significant for HTN, presenting for routine f/u and lab results. Bp at goal. Taking Amlodipine 10 mg po daily. Alk phos elevated, although improved from prior assessments. Pt with hx of cholecystectomy. She denies any abdominal complaints. UA also revealed increased WBCs and leuk esterase. No bacteria. Pt denies dysuria, urinary urgency, urinary frequency, malodorous urine, abd pain, or any urinary complaints. Denies CP or SOB. No other problems stated.   Mammo scheduled 19    (10/28/19): Pt presenting with c/o depressive symptoms. She reports a hx of depression. States, "Sometimes I wake up and don't feel like doing nothing!" She was prescribed an antidepressant in the past by a physician in [place], [place], but stopped because she didn't like how it made her feel. She denies SI/HI or any past " "thoughts/plans. States, "Maybe it's because my man is always at home...sometimes we need a break." She ultimately revealed that she was worried ever since a recent abd US revealed that she had a fatty liver. She thought something was "wrong." She does report that she's cut out some fats and sweets from her diet and has lost 4lbs since her last OV. She denies fever, chills, weakness, dizziness, chest pain, SOB, abd pain, leg pain, B/B incontinence of any other concerns today.    (12/30/19): 59 y.o.  female with PMHx significant for HTN, depression, osteopenia, and GERD presenting for f/u. She was unable to tolerate po calcium supplements after being diagnosed with osteopenia per DXA. However, she states that she is able to take 600 mg of calcium carbonate po but cannot tolerate a higher dose due to GI SE. She's also compliant with OTC Vitamin D. Bp at goal. Taking Amlodipine 10 mg po daily. Tolerating well. Renal indices stable and GFR overall improved from previous. Alk phos remains slightly elevated but stable. CBC and UA unremarkable. FIT + from 07/2019. Pt is still waiting on appt in GI lab. She has a hx of GERD s/p Nissen Fundoplication (6/29/16). She continues with intermittent heart burn and reflux of contents. She cannot take PPIs due to severe allergy. States that she had previously been informed that she could only take Jeanie-Lignite or Maalox. She does report that these OTC preparations relieve her symptoms, although, they do not entirely go away. When discussing need to consider EGD, pt responded, "I already had that" and was sure to state that she was not interested in a repeat EGD at this time. She denies fever, chills, weakness, dizziness, HAs, chest pain, SOB, cough, palpitations, abd pain, leg pain, swelling, B/B incontinence/dysfunction or any other concerns today.    (2/4/2020): 59 y.o.  female with PMHx significant for HTN, depression, osteopenia, and GERD presenting for ED f/u. " "She presented to ED on 1/3/2020 with c/o "squeezing" chest pain. EKG revealed NSR, with probable anterior infarct, age undetermined. Cardiac enzymes were unremarkable. CXR was negative. She was diagnosed with CP due to GERD, pyrosis. She was given a prescription for Pepcid but states that medication was largely ineffective. She cannot tolerate PPIs due to an allergy. She has no c/o chest pain at present. She is also c/o a dry cough that started "a few days ago." She denies any associated symptoms. Denies fever and is afebrile today. She denies any travel outside of the country and denies exposure to any sick contacts within the last 2 weeks. No other problems stated.    (6/23/2020): 59 y.o.  female with PMHx significant for HTN, depression, osteopenia, and GERD/s/p Nissen presenting for routine f/u. Pt referred to GI lab for EGD at last visit. Awaiting appt. H. Pylori was ordered following last OV but not completed. Pt had an H. Pylori conducted in 2015 that was negative. TST 5/25. Referred to cards. Summary below. Pt states, "I had a stress test that I didn't do so well on in the past but they always told me it was because of my reflux." Pt is not taking anything for reflux at this time. She is allergic to PPIs. She completed Famotidine. Takes Maalox as needed. States no issues with reflux over the past several weeks. Alk phos trending down (lowest since 2016). Mammo due in September. H/o + FIT. Referred to GI clinic for screening colonoscopy 7/2019. No appt to date. No acute concerns.    Treadmill stress test conducted 5/25/2020 for c/o chest pain-   1. Fair chronotrophic response.  2. Fair to poor prognosis treadmill stress test.  3. Fair to poor exercise tolerance. Patient is 59 years old, and exercised for 4:12 min:sec on Mendez Protocol.   4. Achieved 83% of maximum predicted heart rate.   5. Did not reach target heart rate.   6. No significant EKG changes on the stress EKG.   7. Patient had no chest " "pain during the treadmill stress test  8. Camacho treadmill score indicate moderate risk    (12/23/2020): 60 y.o.  female with PMHx significant for HTN, depression, osteopenia, and GERD/s/p Nissen presenting for routine f/u. H/o + FIT. Referred to GI clinic for screening colonoscopy 7/2019. She was contacted by GI lab 8/2020 and ultimately declined the procedure. She also declines repeating FIT test at this time. States, "It's been doing good so I don't think I need it." Denies changes in stools/bowel habits or bloody stools. Has not been seen in Cards d/t abnl TST 5/25/20. Mammo 9/2020 negative. Seen in GYN for annual 10/2020. Declines vaccines today. Reports left ear was hurting yesterday. No pain today, just wants ear checked. States, "Maybe it was the wind." Asked about getting ears wet and she replied, "I could have." CMP was ordered but pt did not complete. No other concerns.    (3/23/2021): 60 y.o.  female with PMHx significant for HTN, depression, osteopenia, and GERD/s/p Nissen 2016 presenting for ED f/u. She was seen in ED 2x this month with c/o CP, later diagnosed at GERD. Believes it was induced by intake of fried foods. She was referred to GI lab 2/2020 for GERD. Declined this is the past. She was prescribed Tagamet during ED visit on 3/9/21. States ineffective, although she feels "a lot better" than during ED visit. She is following Cards for CP. She underwent a stress Echo 2/26/21 that was: Negative for ischemia. She is scheduled for an Echo 4/20/21. H/o + FIT. Referred to GI clinic for screening colonoscopy 7/2019. She was contacted by GI lab 8/2020 and ultimately declined the procedure. She also declines repeating FIT test at this time. States, "It's been doing good so I don't think I need it." Denies changes in stools, changes in bowel habits, appearance of stools (i.e., no pencil-like or stringy stools) or bloody stools. Denies CP or SOB. No other concerns at " "present.    (6/22/2021): 60 y.o.  female with PMHx significant for HTN, depression, osteopenia, and GERD/s/p Nissen 2016 presenting for f/u. She was seen in Cards 5/21/21 for f/u KRUEGER. Echo 4/2021 revealed an EF ~50-55%; stress Echo 2/26/21 that was: Negative for ischemia. Reported that KRUEGER had improved. Today, she reports KRUEGER essentially resolved. Bp at goal. Compliant with Amlodipine. She had a televisit with GI 3/30/21 due to chronic GERD. Scheduled for EGD 8/30/2021. Not taking any antacids. States Tagamet and Carafate caused HAs. Labs reviewed and unremarkable. Mammo due 9/2021. Declines vaccines. Continues to decline colonoscopy, but is amenable to repeating FIT. No acute concerns.    Today's Visit (12/23/2021): 61y.o.  female with PMHx significant for HTN, depression, osteopenia, and GERD/s/p Nissen 2016 presenting for f/u. She continues to follow Cards. Bp at goal. Compliant with Amlodipine. She was scheduled for EGD 8/30/2021, but appt was pushed back to March 2022. Pt reports "I'm fine," and okay with waiting for appt. Labs reviewed and unremarkable compared to personal baseline. Of note, pt was diagnosed with COVID-19 in July 2021, s/p mAb. She also reports that she received the second COVID vaccine last month. She is c/o right ear pain, "like a toothache," off and on since last visit. Denies fever, chills, or drainage. Also c/o intermittent right knee ache, pop, and swelling managed with elevation and ice packs. Denies falls or overt injuries. No other concerns.    Health Maintenance:   Colon Ca Screening-FIT negative 7/2021   Breast Ca Screening-Mammogram negative 10/2021   Cervical Ca screening- Pap smear 4/3/2019 nil, High-risk HPV negative    6/23/22: Patient presenting for routine f/u. She is now s/p EGD on 3/23/22 that revealed mild, chronic gastritis. Also revealed loose Nissen, however, patient told she's not likely a candidate for repeat surgery. She was prescribed Pantoprazole " "but unable to tolerate PPIs. States takes Tagamet with minimal relief. KRUEGER remains; reports slightly worse than previous. Used to become dyspneic with ambulating long distances. Now states, "I don't even have to go that far." Pt has had an unremarkable cardiac work-up. Last seen in [facility] 3/2022. She had a negative CXR 3/2021. She's never had a CT of her lungs. Nonsmoker. Labs reviewed with patient and overall unremarkable besides a slight elevation in HCT and MCV. She is requesting a completion of the medical examiner's certification of mobility impairment due to cannot ambulate > 200ft w/o having to stop to rest. She denies any other concerns today.    TV 12/22/22: Patient presenting for routine f/u. Labs done 12/16/22 stable compared to baseline with exception of mild increase in creatinine and eGFR 59. She continues with Amlodipine 10 mg po daily for HTN. Tolerating well. VSS. She had a negative mammogram earlier this month. She is scheduled to see GYN in March 2023. Amenable to repeating FIT/declines colonoscopy. She does c/o intermittent aching pain to knees and fingers exacerbated by weather changes. She's not tried anything for pain recently, but has used oral Diclofenac in the past. Patient had COVID 7/3/22. She's completely recovered. No other concerns.      Review of Systems     Review of Systems   All other systems reviewed and are negative.    Medical / Social / Family History   History reviewed. No pertinent past medical history.    Past Surgical History:   Procedure Laterality Date    BIOPSY      Gastrointestinal    CHOLECYSTECTOMY      ESOPHAGOGASTRODUODENOSCOPY  03/23/2022    HERNIA REPAIR      LAPAROSCOPIC NISSEN FUNDOPLICATION  06/29/2016    TUBAL LIGATION         Social History  Ms.  reports that she has never smoked. She has never used smokeless tobacco. She reports that she does not drink alcohol and does not use drugs.    Family History  Ms.'s family history includes COPD in her sister; Cancer " in her father; Hypertension in her mother and sister.    Medications and Allergies     Medications  Medication List with Changes/Refills   New Medications    DICLOFENAC SODIUM (VOLTAREN) 1 % GEL    Apply 2 g topically 4 (four) times daily as needed (pain).   Current Medications    ALBUTEROL (PROVENTIL HFA) 90 MCG/ACTUATION INHALER    Inhale 1-2 puffs into the lungs every 6 (six) hours as needed for Wheezing or Shortness of Breath. Rescue    CALCIUM-VITAMIN D3 (CALCIUM 500 + D) 500 MG-5 MCG (200 UNIT) PER TABLET    Take 1 tablet by mouth 2 (two) times daily with meals.    CYANOCOBALAMIN (VITAMIN B-12) 100 MCG TABLET    Take 100 mcg by mouth once daily.   Changed and/or Refilled Medications    Modified Medication Previous Medication    AMLODIPINE (NORVASC) 10 MG TABLET amLODIPine (NORVASC) 10 MG tablet       Take 1 tablet (10 mg total) by mouth once daily.    Take 1 tablet (10 mg total) by mouth once daily.   Discontinued Medications    DICLOFENAC (VOLTAREN) 75 MG EC TABLET    Take 1 tablet (75 mg total) by mouth 2 (two) times daily.       Allergies  Review of patient's allergies indicates:   Allergen Reactions    Esomeprazole Shortness Of Breath    Omeprazole magnesium Shortness Of Breath    Codeine     Promethazine-phenyleph-codeine      Other reaction(s): patient does not recall reaction       Physical Examination     Vitals:    12/22/22 0739   BP: 124/78   Pulse: 65   Resp: 20   Temp: 97.6 °F (36.4 °C)     Physical Exam  Constitutional:       Appearance: Normal appearance. She is obese.   HENT:      Head: Normocephalic and atraumatic.      Nose: Nose normal.   Eyes:      Extraocular Movements: Extraocular movements intact.      Conjunctiva/sclera: Conjunctivae normal.      Pupils: Pupils are equal, round, and reactive to light.   Cardiovascular:      Rate and Rhythm: Normal rate and regular rhythm.      Pulses: Normal pulses.      Heart sounds: Normal heart sounds.   Pulmonary:      Effort: Pulmonary effort is  normal.      Breath sounds: Normal breath sounds.   Abdominal:      General: Bowel sounds are normal.      Palpations: Abdomen is soft. There is no mass.   Musculoskeletal:         General: Normal range of motion.      Cervical back: Normal range of motion and neck supple.   Skin:     General: Skin is warm and dry.   Neurological:      General: No focal deficit present.      Mental Status: She is alert and oriented to person, place, and time.   Psychiatric:         Mood and Affect: Mood normal.         Behavior: Behavior normal.         Thought Content: Thought content normal.         Judgment: Judgment normal.         Results     Lab Results   Component Value Date    WBC 6.7 12/16/2022    RBC 5.06 12/16/2022    HGB 15.5 12/16/2022    HCT 49.4 (H) 12/16/2022    MCV 97.6 (H) 12/16/2022    MCH 30.6 12/16/2022    MCHC 31.4 (L) 12/16/2022    RDW 12.9 12/16/2022     12/16/2022    MPV 10.6 12/16/2022     CMP  Sodium Level   Date Value Ref Range Status   12/16/2022 142 136 - 145 mmol/L Final     Potassium Level   Date Value Ref Range Status   12/16/2022 4.8 3.5 - 5.1 mmol/L Final     Carbon Dioxide   Date Value Ref Range Status   12/16/2022 26 23 - 31 mmol/L Final     Blood Urea Nitrogen   Date Value Ref Range Status   12/16/2022 16.1 9.8 - 20.1 mg/dL Final     Creatinine   Date Value Ref Range Status   12/16/2022 1.08 (H) 0.55 - 1.02 mg/dL Final     Calcium Level Total   Date Value Ref Range Status   12/16/2022 10.2 8.4 - 10.2 mg/dL Final     Albumin Level   Date Value Ref Range Status   12/16/2022 4.0 3.4 - 4.8 g/dL Final     Bilirubin Total   Date Value Ref Range Status   12/16/2022 0.6 <=1.5 mg/dL Final     Alkaline Phosphatase   Date Value Ref Range Status   12/16/2022 133 40 - 150 unit/L Final     Aspartate Aminotransferase   Date Value Ref Range Status   12/16/2022 12 5 - 34 unit/L Final     Alanine Aminotransferase   Date Value Ref Range Status   12/16/2022 13 0 - 55 unit/L Final     Estimated GFR-Non     Date Value Ref Range Status   06/20/2022 >60 mls/min/1.73/m2 Final     Lab Results   Component Value Date    CHOL 169 06/20/2022     Lab Results   Component Value Date    HDL 42 06/20/2022     No results found for: LDLCALC  Lab Results   Component Value Date    TRIG 105 06/20/2022     No results found for: CHOLHDL  Lab Results   Component Value Date    TSH 4.1120 06/20/2022     Lab Results   Component Value Date    PHUR 8.5 (H) 06/22/2021    PROTEINUA Negative 12/16/2022    GLUCUA Negative 06/22/2021    KETONESU Negative 06/22/2021    OCCULTUA Negative 06/22/2021    NITRITE Negative 06/22/2021    LEUKOCYTESUR Negative 12/16/2022           Assessment and Plan (including Health Maintenance)     Plan:         Health Maintenance Due   Topic Date Due    Cervical Cancer Screening  Never done    COVID-19 Vaccine (3 - Booster for Pfizer series) 01/10/2022    Colorectal Cancer Screening  Never done       Problem List Items Addressed This Visit          Cardiac/Vascular    Hypertension    Overview     Amlodipine 10 mg po daily         Current Assessment & Plan     Bp at goal  Continue CCB  DASH         Relevant Medications    amLODIPine (NORVASC) 10 MG tablet    KRUEGER (dyspnea on exertion)    Overview     Work-up has included:  Echocardiogram on April 20, 2021 which revealed an ejection fraction of approximately 50 to 55% with trace pericardial effusion without hemodynamic significance. She completed an Exercise Stress echocardiogram on February 26, 2021 which was negative for ischemia.   CXR 3/17/21: negative           Current Assessment & Plan     No complaints today  See unremarkable work-up above  Declines CT 2/2 cost. Will monitor  Sx likely r/t GERD--unable to tolerate PPI 2/2 allergy            Oncology    Elevated hematocrit    Current Assessment & Plan     Hct improved  Non-smoker  CT thorax ordered at last visit but never done. Pt defers 2/2 costs            Other    Wellness examination    Overview      Health Maintenance:   Colon Ca Screening-FIT negative 7/2021   Breast Ca Screening-Mammogram BI-RADS: 1 Negative, 12/9/2022   Cervical Ca screening- Pap smear 4/3/2019 nil, High-risk HPV negative         Relevant Orders    Urinalysis, Reflex to Urine Culture Urine, Clean Catch    TSH    Lipid Panel    Comprehensive Metabolic Panel    CBC Auto Differential     Other Visit Diagnoses       Screening for colon cancer    -  Primary    Relevant Orders    OCCULT BLOOD FECAL IMMUNOASSAY    Screening for diabetes mellitus        Relevant Orders    Hemoglobin A1C    Osteoarthritis, unspecified osteoarthritis type, unspecified site        Relevant Medications    diclofenac sodium (VOLTAREN) 1 % Gel            Health Maintenance Topics with due status: Not Due       Topic Last Completion Date    Lipid Panel 06/20/2022    Mammogram 12/09/2022       Future Appointments   Date Time Provider Department Center   3/31/2023  7:30 AM ANDREW Back [facility]   6/22/2023  7:30 AM [facility]            Signature:  CHELSEA Morales  [facility]  [address] [facility] [address]    Date of encounter: 12/22/22

## 2023-01-25 ENCOUNTER — DOCUMENTATION ONLY (OUTPATIENT)
Dept: ADMINISTRATIVE | Facility: HOSPITAL | Age: 63
End: 2023-01-25

## 2023-02-14 ENCOUNTER — OFFICE VISIT (OUTPATIENT)
Dept: FAMILY MEDICINE | Facility: CLINIC | Age: 63
End: 2023-02-14
Payer: MEDICAID

## 2023-02-14 VITALS
TEMPERATURE: 98 F | HEIGHT: 65 IN | DIASTOLIC BLOOD PRESSURE: 80 MMHG | SYSTOLIC BLOOD PRESSURE: 120 MMHG | OXYGEN SATURATION: 99 % | BODY MASS INDEX: 32.8 KG/M2 | WEIGHT: 196.88 LBS

## 2023-02-14 DIAGNOSIS — R71.8 ELEVATED HEMATOCRIT: Primary | ICD-10-CM

## 2023-02-14 DIAGNOSIS — R06.01 ORTHOPNEA: ICD-10-CM

## 2023-02-14 DIAGNOSIS — I10 HYPERTENSION, UNSPECIFIED TYPE: ICD-10-CM

## 2023-02-14 DIAGNOSIS — I10 PRIMARY HYPERTENSION: ICD-10-CM

## 2023-02-14 DIAGNOSIS — R06.09 DOE (DYSPNEA ON EXERTION): ICD-10-CM

## 2023-02-14 DIAGNOSIS — M85.80 OSTEOPENIA, UNSPECIFIED LOCATION: ICD-10-CM

## 2023-02-14 DIAGNOSIS — Z00.00 ENCOUNTER FOR MEDICAL EXAMINATION TO ESTABLISH CARE: ICD-10-CM

## 2023-02-14 LAB
BASOPHILS # BLD AUTO: 0.01 X10(3)/MCL (ref 0.01–0.08)
BASOPHILS NFR BLD AUTO: 0.2 % (ref 0.1–1.2)
EOSINOPHIL # BLD AUTO: 0.1 X10(3)/MCL (ref 0.04–0.36)
EOSINOPHIL NFR BLD AUTO: 1.5 % (ref 0.7–7)
ERYTHROCYTE [DISTWIDTH] IN BLOOD BY AUTOMATED COUNT: 12.9 % (ref 11–14.5)
HCT VFR BLD AUTO: 48 % (ref 36–48)
HGB BLD-MCNC: 15.8 GM/DL (ref 11.8–16)
IMM GRANULOCYTES # BLD AUTO: 0.02 X10(3)/MCL (ref 0–0.03)
IMM GRANULOCYTES NFR BLD AUTO: 0.3 % (ref 0–0.5)
LYMPHOCYTES # BLD AUTO: 1.84 X10(3)/MCL (ref 1.16–3.74)
LYMPHOCYTES NFR BLD AUTO: 27.6 % (ref 20–55)
MCH RBC QN AUTO: 31.5 PG (ref 27–34)
MCV RBC AUTO: 95.8 FL (ref 79–99)
MEAN CELL HEMOGLOBIN CONCENTRATION (OHS) G/DL: 32.9 G/DL (ref 31–37)
MONOCYTES # BLD AUTO: 0.61 X10(3)/MCL (ref 0.24–0.36)
MONOCYTES NFR BLD AUTO: 9.2 % (ref 4.7–12.5)
NEUTROPHILS # BLD AUTO: 4.08 X10(3)/MCL (ref 1.56–6.13)
NEUTROPHILS NFR BLD AUTO: 61.2 % (ref 37–73)
NRBC BLD AUTO-RTO: 0 % (ref 0–1)
PLATELET # BLD AUTO: 371 X10(3)/MCL (ref 140–371)
PMV BLD AUTO: 11.2 FL (ref 9.4–12.4)
RBC # BLD AUTO: 5.01 X10(6)/MCL (ref 4–5.1)
VIT B12 SERPL-MCNC: 964 PG/ML (ref 211–946)
WBC # SPEC AUTO: 6.7 X10(3)/MCL (ref 4–11.5)

## 2023-02-14 PROCEDURE — 82668 ASSAY OF ERYTHROPOIETIN: CPT | Performed by: NURSE PRACTITIONER

## 2023-02-14 PROCEDURE — 99213 OFFICE O/P EST LOW 20 MIN: CPT | Mod: ,,, | Performed by: NURSE PRACTITIONER

## 2023-02-14 PROCEDURE — 85025 COMPLETE CBC W/AUTO DIFF WBC: CPT | Performed by: NURSE PRACTITIONER

## 2023-02-14 PROCEDURE — 85060 BLOOD SMEAR INTERPRETATION: CPT | Performed by: NURSE PRACTITIONER

## 2023-02-14 PROCEDURE — 99213 PR OFFICE/OUTPT VISIT, EST, LEVL III, 20-29 MIN: ICD-10-PCS | Mod: ,,, | Performed by: NURSE PRACTITIONER

## 2023-02-14 PROCEDURE — 82607 VITAMIN B-12: CPT | Performed by: NURSE PRACTITIONER

## 2023-02-14 RX ORDER — AMLODIPINE BESYLATE 10 MG/1
10 TABLET ORAL DAILY
Qty: 90 TABLET | Refills: 3 | Status: SHIPPED | OUTPATIENT
Start: 2023-02-14 | End: 2023-07-10 | Stop reason: SDUPTHER

## 2023-02-14 NOTE — PROGRESS NOTES
"Patient ID: Martha Merino is a 62 y.o. female.    Chief Complaint: Establish Care                     History of Present Illness:  The patient is 62 y.o. White female for evaluation and management with a chief complaint of Establish Care.  Previously followed by primary care clinic at [facility] and [facility].  Diagnosed with hypertension approximately 8 years ago.  She reports being stable on amlodipine since diagnosis.    Diagnosed with osteopenia in November 2019.  She was placed on calcium and vitamin-D which she is still on today.  Currently takes B12.  She is unsure why she is taking this but has been on it for "a long time."    She complains of dyspnea on exertion.  Occurs while driving or with exertion.  She has to stop and rest when doing housework or walking to her car.  She feels that the dyspnea on exertion began about 5 years ago after Nissen fundoplication but has worsened since onset.  She was established with Cardiology at [facility] and underwent exercise stress test.  An angiogram was recommended but patient reports that she is "scared of dye test." She complains that she is unable to sleep supine. Has 2 proper head up with 3-4 pillows at night because she feels that she is unable to catch her breath if she lays flat.  She reports a history of bilateral ankle edema but has not had any recently.  She uses her albuterol inhaler about 2 times per day but does not find this to be very effective for her shortness of breath. No associated chest pain, palpitations, or diaphoresis.     Treadmill stress test conducted 5/25/2020 for c/o chest pain-   1. Fair chronotrophic response.  2. Fair to poor prognosis treadmill stress test.  3. Fair to poor exercise tolerance. Patient is 59 years old, and exercised for 4:12 min:sec on Mendez Protocol.   4. Achieved 83% of maximum predicted heart rate.   5. Did not reach target heart rate.   6. No significant EKG changes on the stress EKG.   7. Patient had no chest pain during the " treadmill stress test  8. Camacho treadmill score indicate moderate risk      Review of Systems   Constitutional:  Positive for fatigue. Negative for activity change, appetite change and fever.   HENT:  Negative for ear pain, hearing loss and trouble swallowing.    Eyes:  Negative for pain and visual disturbance.   Respiratory:  Positive for shortness of breath. Negative for cough, chest tightness and wheezing.    Cardiovascular:  Negative for chest pain, palpitations, leg swelling and claudication.   Gastrointestinal:  Negative for abdominal pain, blood in stool, change in bowel habit, constipation, diarrhea, nausea, vomiting and change in bowel habit.   Endocrine: Negative for cold intolerance, heat intolerance, polydipsia, polyphagia and polyuria.   Genitourinary:  Negative for dysuria, frequency and hematuria.   Musculoskeletal:  Negative for gait problem and joint swelling.   Integumentary:  Negative for rash and mole/lesion.   Allergic/Immunologic: Negative for immunocompromised state.   Neurological:  Negative for dizziness, seizures, weakness, headaches and memory loss.   Psychiatric/Behavioral:  Negative for agitation, confusion and sleep disturbance. The patient is not nervous/anxious.        Past Medical History:  has a past medical history of Arthritis, GERD (gastroesophageal reflux disease), Hiatal hernia, Hypertension, Obesity, and SOB (shortness of breath) on exertion.    Current Outpatient Medications   Medication Instructions    albuterol (PROVENTIL HFA) 90 mcg/actuation inhaler 1-2 puffs, Inhalation, Every 6 hours PRN, Rescue    amLODIPine (NORVASC) 10 mg, Oral, Daily    calcium-vitamin D3 (OS-ANGELICA 500 + D3) 500 mg-5 mcg (200 unit) per tablet 1 tablet, Oral, 2 times daily with meals    cyanocobalamin (VITAMIN B-12) 100 mcg, Oral, Daily    diclofenac sodium (VOLTAREN) 2 g, Topical (Top), 4 times daily PRN       Patient is allergic to esomeprazole, omeprazole magnesium, codeine,  "promethazine-phenyleph-codeine, nexium [esomeprazole magnesium], and prilosec [omeprazole].       Visit Vitals  /80 (BP Location: Right arm)   Temp 97.9 °F (36.6 °C) (Oral)   Ht 5' 5" (1.651 m)   Wt 89.3 kg (196 lb 13.9 oz)   SpO2 99%   BMI 32.76 kg/m²         Physical Exam  Vitals and nursing note reviewed.   Constitutional:       Appearance: Normal appearance. She is normal weight.   HENT:      Head: Normocephalic.      Right Ear: Tympanic membrane, ear canal and external ear normal.      Left Ear: Tympanic membrane, ear canal and external ear normal.      Mouth/Throat:      Mouth: Mucous membranes are moist.   Eyes:      Extraocular Movements: Extraocular movements intact.      Conjunctiva/sclera: Conjunctivae normal.      Pupils: Pupils are equal, round, and reactive to light.   Cardiovascular:      Rate and Rhythm: Normal rate and regular rhythm.      Heart sounds: Murmur (systolic at LUSB) heard.   Pulmonary:      Effort: Pulmonary effort is normal.      Breath sounds: Normal breath sounds.   Abdominal:      General: Bowel sounds are normal. There is no distension.      Palpations: Abdomen is soft.      Tenderness: There is no abdominal tenderness.   Musculoskeletal:         General: Normal range of motion.      Cervical back: Normal range of motion and neck supple.   Skin:     General: Skin is warm and dry.   Neurological:      Mental Status: She is alert and oriented to person, place, and time. Mental status is at baseline.   Psychiatric:         Mood and Affect: Mood normal.         Thought Content: Thought content normal.         Judgment: Judgment normal.       Assessment/Plan:    ICD-10-CM ICD-9-CM   1. Elevated hematocrit  R71.8 282.7   2. Encounter for medical examination to establish care  Z00.00 V70.9   3. Osteopenia, unspecified location  M85.80 733.90   4. KRUEGER (dyspnea on exertion)  R06.09 786.09   5. Orthopnea  R06.01 786.02   6. Primary hypertension  I10 401.9       1. Encounter for medical " examination to establish care  Recent labs, records from previous PCP, and imaging reviewed    2. Osteopenia, unspecified location  - CT Bone Density Study 1 + Sites Axial; Future    3. Elevated hematocrit  - CBC Auto Differential; Future  - Vitamin B12; Future  - Path Review, Peripheral Smear; Future  - Erythropoietin; Future    4. KRUEGER (dyspnea on exertion)  - Ambulatory referral/consult to Cardiology; Future    5. Orthopnea  - Ambulatory referral/consult to Cardiology; Future    6. Primary hypertension  Well controlled today   Continue amlodipine 10 mg daily         Follow up in about 4 weeks (around 3/14/2023) for lab f/u. or sooner as needed.    Future Appointments   Date Time Provider Department Center   6/22/2023  7:30 AM [facility]HELSEA Morales [facility]   1/4/2024  1:30 PM ANDREW Back [facility]         [facility]

## 2023-02-15 LAB
EPO SERPL-ACNC: 10.7 MIU/ML (ref 2.6–18.5)
HEMATOLOGIST REVIEW: NORMAL

## 2023-02-20 ENCOUNTER — HOSPITAL ENCOUNTER (OUTPATIENT)
Dept: RADIOLOGY | Facility: HOSPITAL | Age: 63
Discharge: HOME | End: 2023-02-20
Attending: NURSE PRACTITIONER
Payer: MEDICAID

## 2023-02-20 DIAGNOSIS — M85.80 OSTEOPENIA, UNSPECIFIED LOCATION: ICD-10-CM

## 2023-02-20 PROCEDURE — 77078 CT BONE DENSITY AXIAL: CPT | Mod: TC

## 2023-03-14 ENCOUNTER — OFFICE VISIT (OUTPATIENT)
Dept: FAMILY MEDICINE | Facility: CLINIC | Age: 63
End: 2023-03-14
Payer: MEDICAID

## 2023-03-14 VITALS
HEART RATE: 83 BPM | WEIGHT: 196.63 LBS | TEMPERATURE: 98 F | OXYGEN SATURATION: 99 % | SYSTOLIC BLOOD PRESSURE: 138 MMHG | BODY MASS INDEX: 32.76 KG/M2 | DIASTOLIC BLOOD PRESSURE: 84 MMHG | HEIGHT: 65 IN

## 2023-03-14 DIAGNOSIS — I10 PRIMARY HYPERTENSION: ICD-10-CM

## 2023-03-14 DIAGNOSIS — M85.80 OSTEOPENIA, UNSPECIFIED LOCATION: Primary | ICD-10-CM

## 2023-03-14 DIAGNOSIS — R71.8 ELEVATED HEMATOCRIT: ICD-10-CM

## 2023-03-14 DIAGNOSIS — Z00.00 WELLNESS EXAMINATION: ICD-10-CM

## 2023-03-14 DIAGNOSIS — Z12.11 SCREENING FOR COLON CANCER: ICD-10-CM

## 2023-03-14 DIAGNOSIS — R06.09 DYSPNEA ON EXERTION: ICD-10-CM

## 2023-03-14 PROCEDURE — 1160F PR REVIEW ALL MEDS BY PRESCRIBER/CLIN PHARMACIST DOCUMENTED: ICD-10-PCS | Mod: CPTII,,, | Performed by: NURSE PRACTITIONER

## 2023-03-14 PROCEDURE — 3008F PR BODY MASS INDEX (BMI) DOCUMENTED: ICD-10-PCS | Mod: CPTII,,, | Performed by: NURSE PRACTITIONER

## 2023-03-14 PROCEDURE — 1159F PR MEDICATION LIST DOCUMENTED IN MEDICAL RECORD: ICD-10-PCS | Mod: CPTII,,, | Performed by: NURSE PRACTITIONER

## 2023-03-14 PROCEDURE — 99213 PR OFFICE/OUTPT VISIT, EST, LEVL III, 20-29 MIN: ICD-10-PCS | Mod: ,,, | Performed by: NURSE PRACTITIONER

## 2023-03-14 PROCEDURE — 3075F SYST BP GE 130 - 139MM HG: CPT | Mod: CPTII,,, | Performed by: NURSE PRACTITIONER

## 2023-03-14 PROCEDURE — 3075F PR MOST RECENT SYSTOLIC BLOOD PRESS GE 130-139MM HG: ICD-10-PCS | Mod: CPTII,,, | Performed by: NURSE PRACTITIONER

## 2023-03-14 PROCEDURE — 1159F MED LIST DOCD IN RCRD: CPT | Mod: CPTII,,, | Performed by: NURSE PRACTITIONER

## 2023-03-14 PROCEDURE — 3008F BODY MASS INDEX DOCD: CPT | Mod: CPTII,,, | Performed by: NURSE PRACTITIONER

## 2023-03-14 PROCEDURE — 99213 OFFICE O/P EST LOW 20 MIN: CPT | Mod: ,,, | Performed by: NURSE PRACTITIONER

## 2023-03-14 PROCEDURE — 3079F PR MOST RECENT DIASTOLIC BLOOD PRESSURE 80-89 MM HG: ICD-10-PCS | Mod: CPTII,,, | Performed by: NURSE PRACTITIONER

## 2023-03-14 PROCEDURE — 1160F RVW MEDS BY RX/DR IN RCRD: CPT | Mod: CPTII,,, | Performed by: NURSE PRACTITIONER

## 2023-03-14 PROCEDURE — 3079F DIAST BP 80-89 MM HG: CPT | Mod: CPTII,,, | Performed by: NURSE PRACTITIONER

## 2023-03-14 NOTE — PROGRESS NOTES
"Patient ID: Martha Merino is a 62 y.o. female.    Chief Complaint: Results (Lab results)                     History of Present Illness:  The patient is 62 y.o. White female for evaluation and management with a chief complaint of Results (Lab results) .  Presently undergoing workup with Cardiology for orthopnea and dyspnea on exertion.  The dyspnea may occur while driving or while doing housework.  She usually has to stop to rest for it to improve.  No associated chest pain or palpitations.  No other complaints or concerns today.    No prior screening for colon cancer. No family history of colon cancer. No changes in bowel habits, constipation, diarrhea, or hematochezia.     ROS: See HPI      Past Medical History:  has a past medical history of Arthritis, GERD (gastroesophageal reflux disease), Hiatal hernia, Hypertension, Obesity, and SOB (shortness of breath) on exertion.    Current Outpatient Medications   Medication Instructions    albuterol (PROVENTIL HFA) 90 mcg/actuation inhaler 1-2 puffs, Inhalation, Every 6 hours PRN, Rescue    amLODIPine (NORVASC) 10 mg, Oral, Daily    calcium-vitamin D3 (OS-ANGELICA 500 + D3) 500 mg-5 mcg (200 unit) per tablet 1 tablet, Oral, Daily    diclofenac sodium (VOLTAREN) 2 g, Topical (Top), 4 times daily PRN       Patient is allergic to esomeprazole, omeprazole magnesium, codeine, promethazine-phenyleph-codeine, nexium [esomeprazole magnesium], and prilosec [omeprazole].       Visit Vitals  /84 (BP Location: Left arm, Patient Position: Sitting)   Pulse 83   Temp 97.9 °F (36.6 °C) (Temporal)   Ht 5' 5" (1.651 m)   Wt 89.2 kg (196 lb 9.6 oz)   SpO2 99%   BMI 32.72 kg/m²         Physical Exam  Vitals reviewed.   Constitutional:       Appearance: Normal appearance.   Cardiovascular:      Rate and Rhythm: Normal rate and regular rhythm.      Heart sounds: Murmur (At LUSB) heard.   Pulmonary:      Effort: Pulmonary effort is normal.      Breath sounds: Normal breath sounds. "   Abdominal:      General: Bowel sounds are normal.      Palpations: Abdomen is soft.   Skin:     General: Skin is warm and dry.   Neurological:      Mental Status: She is alert.         Assessment/Plan:    ICD-10-CM ICD-9-CM   1. Osteopenia, unspecified location  M85.80 733.90   2. Primary hypertension  I10 401.9   3. Elevated hematocrit  R71.8 282.7   4. Dyspnea on exertion  R06.09 786.09   5. Screening for colon cancer  Z12.11 V76.51       1. Osteopenia, unspecified location  CT bone density shows osteopenia   Continue calcium with vitamin-D    2. Primary hypertension  Well controlled today   Continue amlodipine 10 mg daily    3. Elevated hematocrit  Hematocrit 48, improved from 49.4  Peripheral smear normal  B12 elevated at 964 so B12 supplement stopped    4. Dyspnea on exertion  Obtain records from cardiology     5. Screening for colon cancer  - Cologuard Screening (Multitarget Stool DNA); Future           Follow up in about 4 months (around 7/14/2023) for Wellness, With Fasting Labs Prior. or sooner as needed.    Future Appointments   Date Time Provider Department Center   6/22/2023  7:30 AM CHELSEA Morales [facility]   7/3/2023  9:00 AM LAB, [facility] LABORATORY DRAW STATION [facility]   7/10/2023  8:00 AM [facility]   1/4/2024  1:30 PM ANDREW Back [facility]         [facility]ul

## 2023-03-27 PROBLEM — Z00.00 WELLNESS EXAMINATION: Status: RESOLVED | Noted: 2022-06-22 | Resolved: 2023-03-27

## 2023-04-03 ENCOUNTER — OFFICE VISIT (OUTPATIENT)
Dept: FAMILY MEDICINE | Facility: CLINIC | Age: 63
End: 2023-04-03
Payer: MEDICAID

## 2023-04-03 VITALS
HEIGHT: 65 IN | DIASTOLIC BLOOD PRESSURE: 88 MMHG | HEART RATE: 70 BPM | BODY MASS INDEX: 32.91 KG/M2 | OXYGEN SATURATION: 97 % | SYSTOLIC BLOOD PRESSURE: 130 MMHG | TEMPERATURE: 98 F | WEIGHT: 197.56 LBS

## 2023-04-03 DIAGNOSIS — F41.1 GENERALIZED ANXIETY DISORDER: ICD-10-CM

## 2023-04-03 DIAGNOSIS — R42 DIZZINESS: Primary | ICD-10-CM

## 2023-04-03 PROCEDURE — 1160F PR REVIEW ALL MEDS BY PRESCRIBER/CLIN PHARMACIST DOCUMENTED: ICD-10-PCS | Mod: CPTII,,, | Performed by: NURSE PRACTITIONER

## 2023-04-03 PROCEDURE — 1159F PR MEDICATION LIST DOCUMENTED IN MEDICAL RECORD: ICD-10-PCS | Mod: CPTII,,, | Performed by: NURSE PRACTITIONER

## 2023-04-03 PROCEDURE — 99214 OFFICE O/P EST MOD 30 MIN: CPT | Mod: ,,, | Performed by: NURSE PRACTITIONER

## 2023-04-03 PROCEDURE — 3008F BODY MASS INDEX DOCD: CPT | Mod: CPTII,,, | Performed by: NURSE PRACTITIONER

## 2023-04-03 PROCEDURE — 3075F SYST BP GE 130 - 139MM HG: CPT | Mod: CPTII,,, | Performed by: NURSE PRACTITIONER

## 2023-04-03 PROCEDURE — 1160F RVW MEDS BY RX/DR IN RCRD: CPT | Mod: CPTII,,, | Performed by: NURSE PRACTITIONER

## 2023-04-03 PROCEDURE — 3079F PR MOST RECENT DIASTOLIC BLOOD PRESSURE 80-89 MM HG: ICD-10-PCS | Mod: CPTII,,, | Performed by: NURSE PRACTITIONER

## 2023-04-03 PROCEDURE — 3008F PR BODY MASS INDEX (BMI) DOCUMENTED: ICD-10-PCS | Mod: CPTII,,, | Performed by: NURSE PRACTITIONER

## 2023-04-03 PROCEDURE — 3079F DIAST BP 80-89 MM HG: CPT | Mod: CPTII,,, | Performed by: NURSE PRACTITIONER

## 2023-04-03 PROCEDURE — 93000 POCT EKG 12-LEAD: ICD-10-PCS | Mod: ,,, | Performed by: NURSE PRACTITIONER

## 2023-04-03 PROCEDURE — 1159F MED LIST DOCD IN RCRD: CPT | Mod: CPTII,,, | Performed by: NURSE PRACTITIONER

## 2023-04-03 PROCEDURE — 93000 ELECTROCARDIOGRAM COMPLETE: CPT | Mod: ,,, | Performed by: NURSE PRACTITIONER

## 2023-04-03 PROCEDURE — 99214 PR OFFICE/OUTPT VISIT, EST, LEVL IV, 30-39 MIN: ICD-10-PCS | Mod: ,,, | Performed by: NURSE PRACTITIONER

## 2023-04-03 PROCEDURE — 3075F PR MOST RECENT SYSTOLIC BLOOD PRESS GE 130-139MM HG: ICD-10-PCS | Mod: CPTII,,, | Performed by: NURSE PRACTITIONER

## 2023-04-03 RX ORDER — ESCITALOPRAM OXALATE 5 MG/1
5 TABLET ORAL DAILY
Qty: 30 TABLET | Refills: 0 | Status: SHIPPED | OUTPATIENT
Start: 2023-04-03 | End: 2023-05-02 | Stop reason: SINTOL

## 2023-04-03 NOTE — PROGRESS NOTES
Patient ID: Martha Merino is a 62 y.o. female.    Chief Complaint: Dizziness (For a few days)                     History of Present Illness:  The patient is 62 y.o. White female for evaluation and management with a chief complaint of Dizziness (For a few days) .  She describes as a sensation of feeling lightheaded/dizzy.  Sensation is constant throughout the day whether she is sitting still or moving around.  It does not seem to be affected by movement.  Dizziness has been intermittent for the past several years.  She was previously told that it was related to her anxiety.  Not associated with nausea, vomiting, or headache.  No chest pain or palpitations.  She is currently undergoing workup with Cardiology for dyspnea on exertion.     She has never been on any medication for anxiety or depression because she does not like to take any medication.  She complains that she is constantly worried and anxious.  She is also very easily annoyed and irritable.  She experienced a few days of insomnia but slept well last night.  Denies depression or fatigue.    ROS: See HPI      Past Medical History:  has a past medical history of Arthritis, GERD (gastroesophageal reflux disease), Hiatal hernia, Hypertension, Obesity, and SOB (shortness of breath) on exertion.    Current Outpatient Medications   Medication Instructions    albuterol (PROVENTIL HFA) 90 mcg/actuation inhaler 1-2 puffs, Inhalation, Every 6 hours PRN, Rescue    amLODIPine (NORVASC) 10 mg, Oral, Daily    calcium-vitamin D3 (OS-ANGELICA 500 + D3) 500 mg-5 mcg (200 unit) per tablet 1 tablet, Oral, Daily    diclofenac sodium (VOLTAREN) 2 g, Topical (Top), 4 times daily PRN       Patient is allergic to esomeprazole, omeprazole magnesium, codeine, promethazine-phenyleph-codeine, nexium [esomeprazole magnesium], and prilosec [omeprazole].       Visit Vitals  /88 (BP Location: Right arm, Patient Position: Sitting)   Pulse 70   Temp 97.7 °F (36.5 °C) (Temporal)   Ht 5'  "5" (1.651 m)   Wt 89.6 kg (197 lb 8.5 oz)   SpO2 97%   BMI 32.87 kg/m²         Physical Exam  Vitals reviewed.   Constitutional:       General: She is not in acute distress.     Appearance: Normal appearance. She is not ill-appearing.   HENT:      Right Ear: Tympanic membrane, ear canal and external ear normal.      Left Ear: Tympanic membrane, ear canal and external ear normal.      Mouth/Throat:      Mouth: Mucous membranes are moist.      Pharynx: Oropharynx is clear.   Eyes:      Extraocular Movements: Extraocular movements intact.      Conjunctiva/sclera: Conjunctivae normal.      Pupils: Pupils are equal, round, and reactive to light.   Cardiovascular:      Rate and Rhythm: Normal rate and regular rhythm.      Heart sounds: Normal heart sounds.   Pulmonary:      Effort: Pulmonary effort is normal.      Breath sounds: Normal breath sounds.   Abdominal:      General: Bowel sounds are normal.      Palpations: Abdomen is soft.   Musculoskeletal:      Cervical back: Normal range of motion and neck supple.   Skin:     General: Skin is warm and dry.      Capillary Refill: Capillary refill takes less than 2 seconds.   Neurological:      General: No focal deficit present.      Mental Status: She is alert and oriented to person, place, and time.   Psychiatric:         Mood and Affect: Mood normal.         Behavior: Behavior normal.         Thought Content: Thought content normal.         Judgment: Judgment normal.     GARY-7 Score: 14  Interpretation: Moderate Anxiety     EKG:  Sinus rhythm at 67 beats per minute with PVCs      Assessment/Plan:    ICD-10-CM ICD-9-CM   1. Dizziness  R42 780.4   2. Generalized anxiety disorder  F41.1 300.02       1. Dizziness  2. Generalized anxiety disorder     Orthostatic vitals and EKG reviewed  Continue workup with cardiology; last visit note reviewed    Treat underlying anxiety with Lexapro 5 mg daily  Call office if symptoms worsen or questions arise   If no improvement, consider " referral to ENT  ER precautions given    RTC 4 weeks for anxiety or sooner as needed.    Future Appointments   Date Time Provider Department Center   6/22/2023  7:30 AM CHELSEA Morales [facility]   7/3/2023  9:00 AM LAB [facility] LABORATORY DRAW STATION [facility]NY Juarez [facility]   7/10/2023  8:00 AM [facility]   1/4/2024  1:30 PM ANDREW Back [facility]          [facility]

## 2023-05-01 ENCOUNTER — PATIENT MESSAGE (OUTPATIENT)
Dept: ADMINISTRATIVE | Facility: HOSPITAL | Age: 63
End: 2023-05-01
Payer: MEDICAID

## 2023-05-02 ENCOUNTER — OFFICE VISIT (OUTPATIENT)
Dept: FAMILY MEDICINE | Facility: CLINIC | Age: 63
End: 2023-05-02
Payer: MEDICAID

## 2023-05-02 VITALS
TEMPERATURE: 98 F | HEIGHT: 65 IN | SYSTOLIC BLOOD PRESSURE: 132 MMHG | OXYGEN SATURATION: 97 % | WEIGHT: 198.88 LBS | BODY MASS INDEX: 33.13 KG/M2 | HEART RATE: 64 BPM | DIASTOLIC BLOOD PRESSURE: 82 MMHG

## 2023-05-02 DIAGNOSIS — F32.0 CURRENT MILD EPISODE OF MAJOR DEPRESSIVE DISORDER WITHOUT PRIOR EPISODE: ICD-10-CM

## 2023-05-02 DIAGNOSIS — F41.1 GENERALIZED ANXIETY DISORDER: Primary | ICD-10-CM

## 2023-05-02 PROCEDURE — 1160F PR REVIEW ALL MEDS BY PRESCRIBER/CLIN PHARMACIST DOCUMENTED: ICD-10-PCS | Mod: CPTII,,, | Performed by: NURSE PRACTITIONER

## 2023-05-02 PROCEDURE — 3075F SYST BP GE 130 - 139MM HG: CPT | Mod: CPTII,,, | Performed by: NURSE PRACTITIONER

## 2023-05-02 PROCEDURE — 1159F MED LIST DOCD IN RCRD: CPT | Mod: CPTII,,, | Performed by: NURSE PRACTITIONER

## 2023-05-02 PROCEDURE — 3008F PR BODY MASS INDEX (BMI) DOCUMENTED: ICD-10-PCS | Mod: CPTII,,, | Performed by: NURSE PRACTITIONER

## 2023-05-02 PROCEDURE — 3079F DIAST BP 80-89 MM HG: CPT | Mod: CPTII,,, | Performed by: NURSE PRACTITIONER

## 2023-05-02 PROCEDURE — 99213 PR OFFICE/OUTPT VISIT, EST, LEVL III, 20-29 MIN: ICD-10-PCS | Mod: ,,, | Performed by: NURSE PRACTITIONER

## 2023-05-02 PROCEDURE — 99213 OFFICE O/P EST LOW 20 MIN: CPT | Mod: ,,, | Performed by: NURSE PRACTITIONER

## 2023-05-02 PROCEDURE — 3008F BODY MASS INDEX DOCD: CPT | Mod: CPTII,,, | Performed by: NURSE PRACTITIONER

## 2023-05-02 PROCEDURE — 1160F RVW MEDS BY RX/DR IN RCRD: CPT | Mod: CPTII,,, | Performed by: NURSE PRACTITIONER

## 2023-05-02 PROCEDURE — 3079F PR MOST RECENT DIASTOLIC BLOOD PRESSURE 80-89 MM HG: ICD-10-PCS | Mod: CPTII,,, | Performed by: NURSE PRACTITIONER

## 2023-05-02 PROCEDURE — 3075F PR MOST RECENT SYSTOLIC BLOOD PRESS GE 130-139MM HG: ICD-10-PCS | Mod: CPTII,,, | Performed by: NURSE PRACTITIONER

## 2023-05-02 PROCEDURE — 1159F PR MEDICATION LIST DOCUMENTED IN MEDICAL RECORD: ICD-10-PCS | Mod: CPTII,,, | Performed by: NURSE PRACTITIONER

## 2023-05-02 RX ORDER — VORTIOXETINE 5 MG/1
5 TABLET, FILM COATED ORAL DAILY
Qty: 30 TABLET | Refills: 0 | Status: SHIPPED | OUTPATIENT
Start: 2023-05-02 | End: 2023-05-04 | Stop reason: CLARIF

## 2023-05-02 NOTE — PROGRESS NOTES
"Patient ID: Martha Merino is a 62 y.o. female.    Chief Complaint: Anxiety (Follow up)                     History of Present Illness:  The patient is 62 y.o. White female for evaluation and management with a chief complaint of Anxiety (Follow up) .  Last visit, Lexapro 5 mg daily was started for anxiety.  She finds that her anxiety has improved but she continues to feel nervous, worried, and restless.  She is also very easily agitated.  She complains of worsening depression.  She finds herself crying often during the day since beginning the Lexapro.  Unable to recall any previous psychiatric or antidepressant medication use.  No SI/HI.  Resting well.      Now only experiencing dizziness if she is anxious. Cardiac work-up pending for [facility].     ROS: See HPI    Past Medical History:  has a past medical history of Arthritis, GERD (gastroesophageal reflux disease), Hiatal hernia, Hypertension, Obesity, and SOB (shortness of breath) on exertion.    Current Outpatient Medications   Medication Instructions    albuterol (PROVENTIL HFA) 90 mcg/actuation inhaler 1-2 puffs, Inhalation, Every 6 hours PRN, Rescue    amLODIPine (NORVASC) 10 mg, Oral, Daily    calcium-vitamin D3 (OS-ANGELICA 500 + D3) 500 mg-5 mcg (200 unit) per tablet 1 tablet, Oral, Daily    diclofenac sodium (VOLTAREN) 2 g, Topical (Top), 4 times daily PRN    TRINTELLIX 5 mg, Oral, Daily       Patient is allergic to esomeprazole, omeprazole magnesium, codeine, promethazine-phenyleph-codeine, nexium [esomeprazole magnesium], and prilosec [omeprazole].       Visit Vitals  /82 (BP Location: Right arm, Patient Position: Sitting)   Pulse 64   Temp 97.5 °F (36.4 °C) (Temporal)   Ht 5' 5" (1.651 m)   Wt 90.2 kg (198 lb 13.7 oz)   SpO2 97%   BMI 33.09 kg/m²         Physical Exam  Vitals reviewed.   Constitutional:       Appearance: Normal appearance.   Cardiovascular:      Rate and Rhythm: Normal rate and regular rhythm.      Heart sounds: Normal heart sounds. "   Pulmonary:      Effort: Pulmonary effort is normal.      Breath sounds: Normal breath sounds.   Musculoskeletal:      Cervical back: Normal range of motion and neck supple.   Skin:     General: Skin is warm and dry.   Neurological:      Mental Status: She is alert.   Psychiatric:         Mood and Affect: Mood normal.         Thought Content: Thought content normal.         Judgment: Judgment normal.     GARY-7 Score: 13  Interpretation: Moderate Anxiety     PHQ9 5/2/2023   Total Score 9        Assessment/Plan:    ICD-10-CM ICD-9-CM   1. Generalized anxiety disorder  F41.1 300.02   2. Current mild episode of major depressive disorder without prior episode  F32.0 296.21       1. Generalized anxiety disorder  2. Current mild episode of major depressive disorder without prior episode    Discontinue Lexapro due to worsening of depression   Initiate Trintellix 5 mg daily   Encourage counseling  Call office with worsening symptoms  ER if SI/HI develop    Follow up in about 4 weeks (around 5/30/2023) for anxiety, depression. or sooner as needed.    Future Appointments   Date Time Provider Department Center   6/22/2023  7:30 AM CHELSEA Morales [facility]   7/3/2023  9:00 AM LAB, [facility] LABORATORY DRAW STATION [facility]   7/10/2023  8:00 AM CHELSEA [facility]ul [facility]   1/4/2024  1:30 PM ANDREW Back [facility] CHELSEA Becker

## 2023-05-04 ENCOUNTER — TELEPHONE (OUTPATIENT)
Dept: FAMILY MEDICINE | Facility: CLINIC | Age: 63
End: 2023-05-04
Payer: MEDICAID

## 2023-05-04 DIAGNOSIS — F41.1 GENERALIZED ANXIETY DISORDER: Primary | ICD-10-CM

## 2023-05-04 RX ORDER — VENLAFAXINE HYDROCHLORIDE 37.5 MG/1
37.5 CAPSULE, EXTENDED RELEASE ORAL DAILY
Qty: 30 CAPSULE | Refills: 1 | Status: SHIPPED | OUTPATIENT
Start: 2023-05-04 | End: 2023-05-30 | Stop reason: SINTOL

## 2023-05-04 NOTE — TELEPHONE ENCOUNTER
Please let her know that I sent a prescription for venlafaxine 37.5 mg daily.  This medication should be taken in the morning with breakfast.  Common side effects when starting are nausea, upset stomach, and headache.  If she has any issues, please have her call the office   Initial (On Arrival)

## 2023-05-29 ENCOUNTER — DOCUMENTATION ONLY (OUTPATIENT)
Dept: FAMILY MEDICINE | Facility: CLINIC | Age: 63
End: 2023-05-29
Payer: MEDICAID

## 2023-05-29 LAB — HEMOCCULT STL QL IA: NEGATIVE

## 2023-05-30 ENCOUNTER — OFFICE VISIT (OUTPATIENT)
Dept: FAMILY MEDICINE | Facility: CLINIC | Age: 63
End: 2023-05-30
Payer: MEDICAID

## 2023-05-30 VITALS
DIASTOLIC BLOOD PRESSURE: 72 MMHG | SYSTOLIC BLOOD PRESSURE: 138 MMHG | HEIGHT: 65 IN | BODY MASS INDEX: 32.79 KG/M2 | TEMPERATURE: 99 F | OXYGEN SATURATION: 99 % | WEIGHT: 196.81 LBS

## 2023-05-30 DIAGNOSIS — F41.1 GENERALIZED ANXIETY DISORDER: Primary | ICD-10-CM

## 2023-05-30 DIAGNOSIS — F33.0 MAJOR DEPRESSIVE DISORDER, RECURRENT EPISODE, MILD: ICD-10-CM

## 2023-05-30 PROCEDURE — 99213 PR OFFICE/OUTPT VISIT, EST, LEVL III, 20-29 MIN: ICD-10-PCS | Mod: ,,, | Performed by: NURSE PRACTITIONER

## 2023-05-30 PROCEDURE — 3078F PR MOST RECENT DIASTOLIC BLOOD PRESSURE < 80 MM HG: ICD-10-PCS | Mod: CPTII,,, | Performed by: NURSE PRACTITIONER

## 2023-05-30 PROCEDURE — 99213 OFFICE O/P EST LOW 20 MIN: CPT | Mod: ,,, | Performed by: NURSE PRACTITIONER

## 2023-05-30 PROCEDURE — 3078F DIAST BP <80 MM HG: CPT | Mod: CPTII,,, | Performed by: NURSE PRACTITIONER

## 2023-05-30 PROCEDURE — 3075F PR MOST RECENT SYSTOLIC BLOOD PRESS GE 130-139MM HG: ICD-10-PCS | Mod: CPTII,,, | Performed by: NURSE PRACTITIONER

## 2023-05-30 PROCEDURE — 1160F PR REVIEW ALL MEDS BY PRESCRIBER/CLIN PHARMACIST DOCUMENTED: ICD-10-PCS | Mod: CPTII,,, | Performed by: NURSE PRACTITIONER

## 2023-05-30 PROCEDURE — 3075F SYST BP GE 130 - 139MM HG: CPT | Mod: CPTII,,, | Performed by: NURSE PRACTITIONER

## 2023-05-30 PROCEDURE — 1159F MED LIST DOCD IN RCRD: CPT | Mod: CPTII,,, | Performed by: NURSE PRACTITIONER

## 2023-05-30 PROCEDURE — 1160F RVW MEDS BY RX/DR IN RCRD: CPT | Mod: CPTII,,, | Performed by: NURSE PRACTITIONER

## 2023-05-30 PROCEDURE — 3008F PR BODY MASS INDEX (BMI) DOCUMENTED: ICD-10-PCS | Mod: CPTII,,, | Performed by: NURSE PRACTITIONER

## 2023-05-30 PROCEDURE — 1159F PR MEDICATION LIST DOCUMENTED IN MEDICAL RECORD: ICD-10-PCS | Mod: CPTII,,, | Performed by: NURSE PRACTITIONER

## 2023-05-30 PROCEDURE — 3008F BODY MASS INDEX DOCD: CPT | Mod: CPTII,,, | Performed by: NURSE PRACTITIONER

## 2023-05-30 RX ORDER — ROSUVASTATIN CALCIUM 5 MG/1
5 TABLET, COATED ORAL DAILY
COMMUNITY
Start: 2023-05-17 | End: 2024-03-13 | Stop reason: SDUPTHER

## 2023-05-30 RX ORDER — VORTIOXETINE 5 MG/1
5 TABLET, FILM COATED ORAL DAILY
Qty: 30 TABLET | Refills: 0 | Status: SHIPPED | OUTPATIENT
Start: 2023-05-30 | End: 2023-06-20 | Stop reason: DRUGHIGH

## 2023-05-30 RX ORDER — METOPROLOL TARTRATE 25 MG/1
12.5 TABLET, FILM COATED ORAL 2 TIMES DAILY
COMMUNITY
Start: 2023-05-17 | End: 2024-03-13 | Stop reason: SDUPTHER

## 2023-05-30 RX ORDER — NITROGLYCERIN 0.4 MG/1
0.4 TABLET SUBLINGUAL
COMMUNITY
Start: 2023-05-17

## 2023-05-30 NOTE — PROGRESS NOTES
"Patient ID: Martha Merino is a 62 y.o. female.    Chief Complaint: Anxiety                     History of Present Illness:  The patient is 62 y.o. White female for evaluation and management with a chief complaint of Anxiety .  At last visit, venlafaxine 37.5 mg daily was initiated.  She has been taking on a as needed basis because it worsened her dizziness.  She continues to complain of some anxiety.  She attributes much of her shortness of breath to feeling nervous and worried.  Resting well.  No SI/HI.    Scheduled for follow-up on stress test results today with [facility].    ROS: See HPI    Past Medical History:  has a past medical history of Arthritis, GERD (gastroesophageal reflux disease), Hiatal hernia, Hypertension, Obesity, and SOB (shortness of breath) on exertion.    Current Outpatient Medications   Medication Instructions    albuterol (PROVENTIL HFA) 90 mcg/actuation inhaler 1-2 puffs, Inhalation, Every 6 hours PRN, Rescue    amLODIPine (NORVASC) 10 mg, Oral, Daily    calcium-vitamin D3 (OS-ANGELICA 500 + D3) 500 mg-5 mcg (200 unit) per tablet 1 tablet, Oral, Daily    diclofenac sodium (VOLTAREN) 2 g, Topical (Top), 4 times daily PRN    metoprolol tartrate (LOPRESSOR) 12.5 mg, Oral, 2 times daily    nitroGLYCERIN (NITROSTAT) 0.4 mg, Sublingual, As needed (PRN)    rosuvastatin (CRESTOR) 5 mg, Oral, Daily    TRINTELLIX 5 mg, Oral, Daily       Patient is allergic to esomeprazole, omeprazole magnesium, codeine, promethazine-phenyleph-codeine, nexium [esomeprazole magnesium], and prilosec [omeprazole].       Visit Vitals  /72   Temp 98.6 °F (37 °C) (Temporal)   Ht 5' 5" (1.651 m)   Wt 89.3 kg (196 lb 12.8 oz)   SpO2 99%   BMI 32.75 kg/m²         Physical Exam  Vitals reviewed.   Constitutional:       Appearance: Normal appearance.   Cardiovascular:      Rate and Rhythm: Normal rate and regular rhythm.      Heart sounds: Normal heart sounds.   Pulmonary:      Effort: Pulmonary effort is normal.      Breath " sounds: Normal breath sounds.   Musculoskeletal:      Cervical back: Normal range of motion and neck supple.   Skin:     General: Skin is warm and dry.   Neurological:      Mental Status: She is alert and oriented to person, place, and time. Mental status is at baseline.   Psychiatric:         Mood and Affect: Mood normal.         Thought Content: Thought content normal.     GARY-7 Score: 10  Interpretation: Moderate Anxiety     PHQ9 5/30/2023   Total Score 7        Assessment/Plan:    ICD-10-CM ICD-9-CM   1. Generalized anxiety disorder  F41.1 300.02   2. Major depressive disorder, recurrent episode, mild  F33.0 296.31       1. Generalized anxiety disorder  2. Major depressive disorder, recurrent episode, mild    No improvement with Lexapro, dizziness with Effexor   Discontinue Effexor   Initiate Trintellix 5 mg daily   Encouraged counseling      Follow up in about 4 weeks (around 6/27/2023) for med f/u. or sooner as needed.    Future Appointments   Date Time Provider Department Center   6/22/2023  7:30 AM CHELSEA Morales [facility] [place]    7/3/2023  9:00 AM LAB, [facility] LABORATORY DRAW STATION [facility] KENNY [place]rola   7/10/2023  8:00 AM CHELSEA Azul [facility]   1/4/2024  1:30 PM ANDREW Back [facility]          CHELSEA Azul

## 2023-05-31 ENCOUNTER — TELEPHONE (OUTPATIENT)
Dept: FAMILY MEDICINE | Facility: CLINIC | Age: 63
End: 2023-05-31
Payer: MEDICAID

## 2023-05-31 NOTE — TELEPHONE ENCOUNTER
Pt states she asked Dr. Murillo about trintellix if it's safe to take, she stated he never answered her, she called back this morning and they still have no answer, she will let us know when she finds out.

## 2023-05-31 NOTE — TELEPHONE ENCOUNTER
----- Message from Sanchez Bahena sent at 5/31/2023  9:15 AM CDT -----  Regarding: call back  Pt was given a paper about a message about an medication, but the doctor did not say anything about the medication. Cardio doctor.    [phone]

## 2023-06-20 ENCOUNTER — OFFICE VISIT (OUTPATIENT)
Dept: FAMILY MEDICINE | Facility: CLINIC | Age: 63
End: 2023-06-20
Payer: MEDICAID

## 2023-06-20 VITALS
HEIGHT: 65 IN | WEIGHT: 195.19 LBS | OXYGEN SATURATION: 97 % | TEMPERATURE: 98 F | HEART RATE: 51 BPM | BODY MASS INDEX: 32.52 KG/M2 | SYSTOLIC BLOOD PRESSURE: 110 MMHG | DIASTOLIC BLOOD PRESSURE: 72 MMHG

## 2023-06-20 DIAGNOSIS — R06.09 DOE (DYSPNEA ON EXERTION): ICD-10-CM

## 2023-06-20 DIAGNOSIS — F41.1 GENERALIZED ANXIETY DISORDER: Primary | ICD-10-CM

## 2023-06-20 DIAGNOSIS — M19.90 OSTEOARTHRITIS, UNSPECIFIED OSTEOARTHRITIS TYPE, UNSPECIFIED SITE: ICD-10-CM

## 2023-06-20 DIAGNOSIS — R00.1 BRADYCARDIA: ICD-10-CM

## 2023-06-20 DIAGNOSIS — F33.0 MAJOR DEPRESSIVE DISORDER, RECURRENT EPISODE, MILD: ICD-10-CM

## 2023-06-20 PROCEDURE — 3074F PR MOST RECENT SYSTOLIC BLOOD PRESSURE < 130 MM HG: ICD-10-PCS | Mod: CPTII,,, | Performed by: NURSE PRACTITIONER

## 2023-06-20 PROCEDURE — 3074F SYST BP LT 130 MM HG: CPT | Mod: CPTII,,, | Performed by: NURSE PRACTITIONER

## 2023-06-20 PROCEDURE — 1160F PR REVIEW ALL MEDS BY PRESCRIBER/CLIN PHARMACIST DOCUMENTED: ICD-10-PCS | Mod: CPTII,,, | Performed by: NURSE PRACTITIONER

## 2023-06-20 PROCEDURE — 3078F DIAST BP <80 MM HG: CPT | Mod: CPTII,,, | Performed by: NURSE PRACTITIONER

## 2023-06-20 PROCEDURE — 3008F PR BODY MASS INDEX (BMI) DOCUMENTED: ICD-10-PCS | Mod: CPTII,,, | Performed by: NURSE PRACTITIONER

## 2023-06-20 PROCEDURE — 1160F RVW MEDS BY RX/DR IN RCRD: CPT | Mod: CPTII,,, | Performed by: NURSE PRACTITIONER

## 2023-06-20 PROCEDURE — 99214 PR OFFICE/OUTPT VISIT, EST, LEVL IV, 30-39 MIN: ICD-10-PCS | Mod: ,,, | Performed by: NURSE PRACTITIONER

## 2023-06-20 PROCEDURE — 1159F MED LIST DOCD IN RCRD: CPT | Mod: CPTII,,, | Performed by: NURSE PRACTITIONER

## 2023-06-20 PROCEDURE — 3008F BODY MASS INDEX DOCD: CPT | Mod: CPTII,,, | Performed by: NURSE PRACTITIONER

## 2023-06-20 PROCEDURE — 99214 OFFICE O/P EST MOD 30 MIN: CPT | Mod: ,,, | Performed by: NURSE PRACTITIONER

## 2023-06-20 PROCEDURE — 3078F PR MOST RECENT DIASTOLIC BLOOD PRESSURE < 80 MM HG: ICD-10-PCS | Mod: CPTII,,, | Performed by: NURSE PRACTITIONER

## 2023-06-20 PROCEDURE — 1159F PR MEDICATION LIST DOCUMENTED IN MEDICAL RECORD: ICD-10-PCS | Mod: CPTII,,, | Performed by: NURSE PRACTITIONER

## 2023-06-20 RX ORDER — ASPIRIN 81 MG/1
81 TABLET ORAL DAILY
COMMUNITY

## 2023-06-20 RX ORDER — VORTIOXETINE 10 MG/1
10 TABLET, FILM COATED ORAL DAILY
Qty: 30 TABLET | Refills: 0 | Status: SHIPPED | OUTPATIENT
Start: 2023-06-20 | End: 2023-07-10 | Stop reason: SDUPTHER

## 2023-06-20 RX ORDER — DICLOFENAC SODIUM 10 MG/G
2 GEL TOPICAL 4 TIMES DAILY PRN
Qty: 100 G | Refills: 0 | Status: SHIPPED | OUTPATIENT
Start: 2023-06-20 | End: 2024-01-11 | Stop reason: SDUPTHER

## 2023-06-20 RX ORDER — ALBUTEROL SULFATE 90 UG/1
1-2 AEROSOL, METERED RESPIRATORY (INHALATION) EVERY 6 HOURS PRN
Qty: 18 G | Refills: 1 | Status: SHIPPED | OUTPATIENT
Start: 2023-06-20 | End: 2023-07-10 | Stop reason: SDUPTHER

## 2023-06-20 NOTE — PROGRESS NOTES
"Patient ID: Martha Merino is a 62 y.o. female.    Chief Complaint: Medication Management (F/u)                     History of Present Illness:  The patient is 62 y.o. White female for evaluation and management with a chief complaint of Medication Management (F/u) .  At last visit, Trintellix 5 mg daily was initiated for depression and anxiety.  Mood has improved.  No longer feeling as stressed or on edge.  Much calmer and less agitated towards family members. Family members have also noted improvement in mood.  She feels that her shortness of breath has improved. Denies excessive sadness or fatigue.  Resting well.  No SI/ HI.      Heart rate low 50s today.  Metoprolol was increased to 25 mg b.I.d. about 4 weeks ago by Cardiology.  Since this time, she feels weak and dizziness returned.    Requesting refill  on Voltaren gel and albuterol inhaler.    ROS: See HPI    Past Medical History:  has a past medical history of Arthritis, GERD (gastroesophageal reflux disease), Hiatal hernia, Hypertension, Obesity, and SOB (shortness of breath) on exertion.    Current Outpatient Medications   Medication Instructions    albuterol (PROVENTIL HFA) 90 mcg/actuation inhaler 1-2 puffs, Inhalation, Every 6 hours PRN, Rescue    amLODIPine (NORVASC) 10 mg, Oral, Daily    aspirin (ECOTRIN) 81 mg, Oral, Daily    calcium-vitamin D3 (OS-ANGELICA 500 + D3) 500 mg-5 mcg (200 unit) per tablet 1 tablet, Oral, Daily    diclofenac sodium (VOLTAREN) 2 g, Topical (Top), 4 times daily PRN    metoprolol tartrate (LOPRESSOR) 12.5 mg, Oral, 2 times daily    nitroGLYCERIN (NITROSTAT) 0.4 mg, Sublingual, As needed (PRN)    rosuvastatin (CRESTOR) 5 mg, Oral, Daily    TRINTELLIX 10 mg, Oral, Daily       Patient is allergic to esomeprazole, omeprazole magnesium, codeine, promethazine-phenyleph-codeine, nexium [esomeprazole magnesium], and prilosec [omeprazole].       Visit Vitals  /72   Pulse (!) 51   Temp 97.8 °F (36.6 °C)   Ht 5' 5" (1.651 m)   Wt " 88.5 kg (195 lb 3.2 oz)   SpO2 97%   BMI 32.48 kg/m²         Physical Exam  Vitals reviewed.   Constitutional:       Appearance: Normal appearance.   Cardiovascular:      Rate and Rhythm: Normal rate and regular rhythm.      Heart sounds: Normal heart sounds.   Pulmonary:      Effort: Pulmonary effort is normal.      Breath sounds: Normal breath sounds.   Abdominal:      General: Bowel sounds are normal.      Palpations: Abdomen is soft.      Tenderness: There is no abdominal tenderness.   Musculoskeletal:      Cervical back: Normal range of motion and neck supple. No tenderness.   Lymphadenopathy:      Cervical: No cervical adenopathy.   Skin:     General: Skin is warm and dry.   Neurological:      Mental Status: She is alert. Mental status is at baseline.   Psychiatric:         Mood and Affect: Mood normal.         Thought Content: Thought content normal.         Judgment: Judgment normal.           Assessment/Plan:    ICD-10-CM ICD-9-CM   1. Generalized anxiety disorder  F41.1 300.02   2. Major depressive disorder, recurrent episode, mild  F33.0 296.31   3. Bradycardia  R00.1 427.89   4. Osteoarthritis, unspecified osteoarthritis type, unspecified site  M19.90 715.90   5. KRUEGER (dyspnea on exertion)  R06.09 786.09       1. Generalized anxiety disorder    Improved but remains symptomatic    Increase Trintellix to 10 mg daily  - vortioxetine (TRINTELLIX) 10 mg Tab; Take 1 tablet (10 mg total) by mouth once daily.  Dispense: 30 tablet; Refill: 0    2. Major depressive disorder, recurrent episode, mild  Exercise daily. Get sunlight daily.  Practice positive phrases and repeat throughout the day, along with yoga and relaxation techniques.  Establish good social support, make changes to reduce stress.  Encourage counseling.   Reports any symptoms of suicidal/homicidal ideations or self harm immediately. If clinic is closed, go to nearest emergency room.  - vortioxetine (TRINTELLIX) 10 mg Tab; Take 1 tablet (10 mg total)  by mouth once daily.  Dispense: 30 tablet; Refill: 0    3. Bradycardia   Decrease metoprolol to 12.5 mg b.I.d.  Blood pressure/ pulse log to bring to Cardiology   Will discuss changes with [facility]    4. Osteoarthritis, unspecified osteoarthritis type, unspecified site  - diclofenac sodium (VOLTAREN) 1 % Gel; Apply 2 g topically 4 (four) times daily as needed (pain).  Dispense: 100 g; Refill: 0    5. KRUEGER (dyspnea on exertion)  - albuterol (PROVENTIL HFA) 90 mcg/actuation inhaler; Inhale 1-2 puffs into the lungs every 6 (six) hours as needed for Wheezing or Shortness of Breath. Rescue  Dispense: 18 g; Refill: 1      Return to clinic in 1 month for follow-up on anxiety/ depression or sooner if needed    Future Appointments   Date Time Provider Department Center   6/22/2023  7:30 AM CHELSEA Morales [facility] IRIS Mcguire   7/3/2023  9:00 AM LAB, [facility] LABORATORY DRAW STATION [facility] KENNY [place]rola   7/10/2023  8:00 AM CHELSEA Azul [facility]   1/4/2024  1:30 PM ANDREW Back [facility] Zack          CHELSEA Azul

## 2023-07-03 PROCEDURE — 80061 LIPID PANEL: CPT | Performed by: NURSE PRACTITIONER

## 2023-07-03 PROCEDURE — 84443 ASSAY THYROID STIM HORMONE: CPT | Performed by: NURSE PRACTITIONER

## 2023-07-03 PROCEDURE — 85025 COMPLETE CBC W/AUTO DIFF WBC: CPT | Performed by: NURSE PRACTITIONER

## 2023-07-03 PROCEDURE — 83036 HEMOGLOBIN GLYCOSYLATED A1C: CPT | Performed by: NURSE PRACTITIONER

## 2023-07-03 PROCEDURE — 80053 COMPREHEN METABOLIC PANEL: CPT | Performed by: NURSE PRACTITIONER

## 2023-07-10 ENCOUNTER — OFFICE VISIT (OUTPATIENT)
Dept: FAMILY MEDICINE | Facility: CLINIC | Age: 63
End: 2023-07-10
Payer: MEDICAID

## 2023-07-10 VITALS
TEMPERATURE: 98 F | WEIGHT: 193.63 LBS | HEART RATE: 60 BPM | HEIGHT: 65 IN | SYSTOLIC BLOOD PRESSURE: 132 MMHG | DIASTOLIC BLOOD PRESSURE: 80 MMHG | BODY MASS INDEX: 32.26 KG/M2 | OXYGEN SATURATION: 96 %

## 2023-07-10 DIAGNOSIS — Z00.00 WELLNESS EXAMINATION: Primary | ICD-10-CM

## 2023-07-10 DIAGNOSIS — E66.9 CLASS 1 OBESITY WITH SERIOUS COMORBIDITY AND BODY MASS INDEX (BMI) OF 32.0 TO 32.9 IN ADULT, UNSPECIFIED OBESITY TYPE: ICD-10-CM

## 2023-07-10 DIAGNOSIS — F33.0 MAJOR DEPRESSIVE DISORDER, RECURRENT EPISODE, MILD: ICD-10-CM

## 2023-07-10 DIAGNOSIS — E78.5 HYPERLIPIDEMIA, UNSPECIFIED HYPERLIPIDEMIA TYPE: ICD-10-CM

## 2023-07-10 DIAGNOSIS — M85.80 OSTEOPENIA, UNSPECIFIED LOCATION: ICD-10-CM

## 2023-07-10 DIAGNOSIS — Z53.20 COLONOSCOPY REFUSED: ICD-10-CM

## 2023-07-10 DIAGNOSIS — K21.9 GASTROESOPHAGEAL REFLUX DISEASE WITHOUT ESOPHAGITIS: ICD-10-CM

## 2023-07-10 DIAGNOSIS — F41.1 GENERALIZED ANXIETY DISORDER: ICD-10-CM

## 2023-07-10 DIAGNOSIS — R06.09 DOE (DYSPNEA ON EXERTION): ICD-10-CM

## 2023-07-10 DIAGNOSIS — I10 PRIMARY HYPERTENSION: ICD-10-CM

## 2023-07-10 PROCEDURE — 1160F RVW MEDS BY RX/DR IN RCRD: CPT | Mod: CPTII,,, | Performed by: NURSE PRACTITIONER

## 2023-07-10 PROCEDURE — 3008F BODY MASS INDEX DOCD: CPT | Mod: CPTII,,, | Performed by: NURSE PRACTITIONER

## 2023-07-10 PROCEDURE — 1159F MED LIST DOCD IN RCRD: CPT | Mod: CPTII,,, | Performed by: NURSE PRACTITIONER

## 2023-07-10 PROCEDURE — 1160F PR REVIEW ALL MEDS BY PRESCRIBER/CLIN PHARMACIST DOCUMENTED: ICD-10-PCS | Mod: CPTII,,, | Performed by: NURSE PRACTITIONER

## 2023-07-10 PROCEDURE — 3075F SYST BP GE 130 - 139MM HG: CPT | Mod: CPTII,,, | Performed by: NURSE PRACTITIONER

## 2023-07-10 PROCEDURE — 3075F PR MOST RECENT SYSTOLIC BLOOD PRESS GE 130-139MM HG: ICD-10-PCS | Mod: CPTII,,, | Performed by: NURSE PRACTITIONER

## 2023-07-10 PROCEDURE — 99396 PR PREVENTIVE VISIT,EST,40-64: ICD-10-PCS | Mod: ,,, | Performed by: NURSE PRACTITIONER

## 2023-07-10 PROCEDURE — 3008F PR BODY MASS INDEX (BMI) DOCUMENTED: ICD-10-PCS | Mod: CPTII,,, | Performed by: NURSE PRACTITIONER

## 2023-07-10 PROCEDURE — 3079F DIAST BP 80-89 MM HG: CPT | Mod: CPTII,,, | Performed by: NURSE PRACTITIONER

## 2023-07-10 PROCEDURE — 99396 PREV VISIT EST AGE 40-64: CPT | Mod: ,,, | Performed by: NURSE PRACTITIONER

## 2023-07-10 PROCEDURE — 1159F PR MEDICATION LIST DOCUMENTED IN MEDICAL RECORD: ICD-10-PCS | Mod: CPTII,,, | Performed by: NURSE PRACTITIONER

## 2023-07-10 PROCEDURE — 3079F PR MOST RECENT DIASTOLIC BLOOD PRESSURE 80-89 MM HG: ICD-10-PCS | Mod: CPTII,,, | Performed by: NURSE PRACTITIONER

## 2023-07-10 RX ORDER — AMLODIPINE BESYLATE 5 MG/1
5 TABLET ORAL DAILY
Qty: 90 TABLET | Refills: 3 | Status: SHIPPED | OUTPATIENT
Start: 2023-07-10 | End: 2024-02-12 | Stop reason: SDUPTHER

## 2023-07-10 RX ORDER — ALBUTEROL SULFATE 90 UG/1
1-2 AEROSOL, METERED RESPIRATORY (INHALATION) EVERY 6 HOURS PRN
Qty: 18 G | Refills: 3 | Status: SHIPPED | OUTPATIENT
Start: 2023-07-10 | End: 2024-03-13 | Stop reason: SDUPTHER

## 2023-07-10 RX ORDER — VORTIOXETINE 10 MG/1
10 TABLET, FILM COATED ORAL DAILY
Qty: 90 TABLET | Refills: 3 | Status: SHIPPED | OUTPATIENT
Start: 2023-07-10 | End: 2023-10-11

## 2023-07-10 NOTE — PROGRESS NOTES
Patient ID: Martha Merino is a 62 y.o. female.    Chief Complaint: Annual Exam                     History of Present Illness:  The patient is 62 y.o. White female for evaluation and management with a chief complaint of Annual Exam .  Doing very well after increasing Trintellix to 10 mg daily.  She reports that she is much less agitated and on edge.  Finds that her mood is much more stable.  No longer stress eating.  Resting well.  No SI/HI.    Amlodipine decreased to 5 mg daily by [facility] due to hypotension.  Home blood pressure readings 117/70 after medication adjustment.  Scheduled for follow-up later this month with Dr. Murillo.  She continues to experience dyspnea on exertion.  She reports negative PFT but continues albuterol due to improvement in symptoms.    No prior screening for colon cancer.  We discussed Cologuard and colonoscopy but patient refuses at this time.  No changes in bowel habits, constipation, diarrhea, or hematochezia.  No family history of colon cancer.    Review of Systems   Constitutional:  Negative for activity change, appetite change, fatigue and fever.   HENT:  Negative for ear pain, hearing loss, rhinorrhea and trouble swallowing.    Eyes:  Negative for pain and visual disturbance.   Respiratory:  Negative for cough, chest tightness and shortness of breath.    Cardiovascular:  Negative for chest pain, palpitations, leg swelling and claudication.   Gastrointestinal:  Negative for abdominal pain, blood in stool, change in bowel habit, constipation, diarrhea, nausea, vomiting and change in bowel habit.   Endocrine: Negative for cold intolerance, heat intolerance, polydipsia, polyphagia and polyuria.   Genitourinary:  Negative for dysuria, frequency and hematuria.   Musculoskeletal:  Negative for gait problem and joint swelling.   Integumentary:  Negative for rash and mole/lesion.   Allergic/Immunologic: Negative for environmental allergies.   Neurological:  Negative for dizziness, seizures,  "weakness, headaches and memory loss.   Psychiatric/Behavioral:  Negative for confusion and sleep disturbance. The patient is not nervous/anxious.        Past Medical History:  has a past medical history of Arthritis, GERD (gastroesophageal reflux disease), Hiatal hernia, Hypertension, Obesity, and SOB (shortness of breath) on exertion.    Current Outpatient Medications   Medication Instructions    albuterol (PROVENTIL HFA) 90 mcg/actuation inhaler 1-2 puffs, Inhalation, Every 6 hours PRN, Rescue    amLODIPine (NORVASC) 5 mg, Oral, Daily    aspirin (ECOTRIN) 81 mg, Oral, Daily    calcium-vitamin D3 (OS-ANGELICA 500 + D3) 500 mg-5 mcg (200 unit) per tablet 1 tablet, Oral, Daily    diclofenac sodium (VOLTAREN) 2 g, Topical (Top), 4 times daily PRN    metoprolol tartrate (LOPRESSOR) 12.5 mg, Oral, 2 times daily    nitroGLYCERIN (NITROSTAT) 0.4 mg, Sublingual, As needed (PRN)    rosuvastatin (CRESTOR) 5 mg, Oral, Daily    TRINTELLIX 10 mg, Oral, Daily       Patient is allergic to esomeprazole, omeprazole magnesium, codeine, promethazine-phenyleph-codeine, nexium [esomeprazole magnesium], and prilosec [omeprazole].       Visit Vitals  /80   Pulse 60   Temp 98.1 °F (36.7 °C) (Oral)   Ht 5' 5" (1.651 m)   Wt 87.8 kg (193 lb 9.6 oz)   SpO2 96%   BMI 32.22 kg/m²         Physical Exam  Constitutional:       Appearance: Normal appearance. She is obese.   HENT:      Right Ear: Tympanic membrane, ear canal and external ear normal.      Left Ear: Tympanic membrane, ear canal and external ear normal.      Mouth/Throat:      Mouth: Mucous membranes are moist.      Pharynx: Oropharynx is clear.   Eyes:      General: No scleral icterus.     Extraocular Movements: Extraocular movements intact.      Conjunctiva/sclera: Conjunctivae normal.      Pupils: Pupils are equal, round, and reactive to light.   Cardiovascular:      Rate and Rhythm: Normal rate and regular rhythm.      Pulses: Normal pulses.      Heart sounds: Normal heart " sounds.   Pulmonary:      Effort: Pulmonary effort is normal. No respiratory distress.      Breath sounds: Normal breath sounds.   Abdominal:      General: Abdomen is flat. Bowel sounds are normal.      Palpations: Abdomen is soft.      Tenderness: There is no abdominal tenderness.   Musculoskeletal:         General: Normal range of motion.      Cervical back: Normal range of motion and neck supple.      Right lower leg: No edema.      Left lower leg: No edema.   Skin:     General: Skin is warm and dry.   Neurological:      Mental Status: She is alert and oriented to person, place, and time. Mental status is at baseline.   Psychiatric:         Mood and Affect: Mood normal.         Thought Content: Thought content normal.         Judgment: Judgment normal.       Recent Labs Obtained:  No visits with results within 7 Day(s) from this visit.   Latest known visit with results is:   Lab Visit on 07/03/2023   Component Date Value Ref Range Status    Hemoglobin A1c 07/03/2023 5.4  4.0 - 6.0 % Final    Estimated Average Glucose 07/03/2023 108.3  70.0 - 115.0 mg/dL Final    Thyroid Stimulating Hormone 07/03/2023 2.650  0.360 - 3.740 uIU/mL Final    Cholesterol Total 07/03/2023 112  0 - 200 mg/dL Final    HDL Cholesterol 07/03/2023 42  40 - 60 mg/dL Final    Triglyceride 07/03/2023 89  30 - 200 mg/dL Final    LDL Cholesterol Direct 07/03/2023 54.6  30.0 - 100.0 mg/dL Final    Sodium Level 07/03/2023 141  135 - 145 mmol/L Final    Potassium Level 07/03/2023 4.6  3.5 - 5.1 mmol/L Final    Chloride 07/03/2023 105  98 - 110 mmol/L Final    Carbon Dioxide 07/03/2023 28  21 - 32 mmol/L Final    Glucose Level 07/03/2023 90  70 - 115 mg/dL Final    Blood Urea Nitrogen 07/03/2023 15.0  7.0 - 20.0 mg/dL Final    Creatinine 07/03/2023 0.98  0.66 - 1.25 mg/dL Final    Calcium Level Total 07/03/2023 9.6  8.4 - 10.2 mg/dL Final    Protein Total 07/03/2023 6.8  6.3 - 8.2 gm/dL Final    Albumin Level 07/03/2023 4.4  3.4 - 5.0 g/dL Final     Globulin 07/03/2023 2.4  2.0 - 3.9 gm/dL Final    Albumin/Globulin Ratio 07/03/2023 1.8  ratio Final    Bilirubin Total 07/03/2023 0.8  0.0 - 1.0 mg/dL Final    Alkaline Phosphatase 07/03/2023 126  50 - 144 unit/L Final    Alanine Aminotransferase 07/03/2023 21  1 - 45 unit/L Final    Aspartate Aminotransferase 07/03/2023 26  14 - 36 unit/L Final    eGFR 07/03/2023 66  mls/min/1.73/m2 Final                         EGFR INTERPRETATION    Beginning 8/15/22 we are reporting the eGFRcr calculation as recommended by the [employer]. The eGFRcr equation has similar overall performance characteristics to the older equation, but the values may differ by more than 10% particularly at higher values of eGFRcr and younger adult ages.    NKF stages of chronic kidney disease (CKD)  Stage 1: Kidney damage with normal or increased eGFR (>90 mL/min/1.73 m^2)  Stage 2: Mild reduction in GFR (60-89 mL/min/1.73 m^2)  Stage 3a: Moderate reduction in GFR (45-59 mL/min/1.73 m^2)  Stage 3b: Moderate reduction in GFR (30-44 mL/min/1.73 m^2)  Stage 4: Severe reduction in GFR (15-29 mL/min/1.73 m^2)  Stage 5: Kidney failure (GFR <15 mL/min/1.73 m^2)        Anion Gap 07/03/2023 8.0  2.0 - 13.0 mEq/L Final    BUN/Creatinine Ratio 07/03/2023 15  12 - 20 Final    WBC 07/03/2023 6.21  4.00 - 11.50 x10(3)/mcL Final    RBC 07/03/2023 4.95  4.00 - 5.10 x10(6)/mcL Final    Hgb 07/03/2023 15.3  11.8 - 16.0 g/dL Final    Hct 07/03/2023 46.5  36.0 - 48.0 % Final    MCV 07/03/2023 93.9  79.0 - 99.0 fL Final    MCH 07/03/2023 30.9  27.0 - 34.0 pg Final    MCHC 07/03/2023 32.9  31.0 - 37.0 g/dL Final    RDW 07/03/2023 13.2  11.0 - 14.5 % Final    Platelet 07/03/2023 309  140 - 371 x10(3)/mcL Final    MPV 07/03/2023 11.8  9.4 - 12.4 fL Final    Neut % 07/03/2023 56.1  37 - 73 % Final    Lymph % 07/03/2023 32.4  20 - 55 % Final    Mono % 07/03/2023 9.2  4.7 - 12.5 % Final    Eos % 07/03/2023 1.9  0.7 - 7 % Final    Basophil % 07/03/2023 0.2   0.1 - 1.2 % Final    Lymph # 07/03/2023 2.01  1.16 - 3.74 x10(3)/mcL Final    Neut # 07/03/2023 3.49  1.56 - 6.13 x10(3)/mcL Final    Mono # 07/03/2023 0.57 (H)  0.24 - 0.36 x10(3)/mcL Final    Eos # 07/03/2023 0.12  0.04 - 0.36 x10(3)/mcL Final    Baso # 07/03/2023 0.01  0.01 - 0.08 x10(3)/mcL Final    IG# 07/03/2023 0.01  0.0001 - 0.031 x10(3)/mcL Final    IG% 07/03/2023 0.2  0 - 0.5 % Final    NRBC% 07/03/2023 0.0  <=1 % Final         Assessment/Plan:    ICD-10-CM ICD-9-CM   1. Wellness examination  Z00.00 V70.0   2. Generalized anxiety disorder  F41.1 300.02   3. Major depressive disorder, recurrent episode, mild  F33.0 296.31   4. Primary hypertension  I10 401.9   5. Class 1 obesity with serious comorbidity and body mass index (BMI) of 32.0 to 32.9 in adult, unspecified obesity type  E66.9 278.00    Z68.32 V85.32   6. Gastroesophageal reflux disease without esophagitis  K21.9 530.81   7. Osteopenia, unspecified location  M85.80 733.90   8. KRUEGER (dyspnea on exertion)  R06.09 786.09   9. Hyperlipidemia, unspecified hyperlipidemia type  E78.5 272.4       1. Wellness examination  Discussed results of labs:  White blood cell count 6.21, red blood cell count 4.95, hemoglobin 15.3, hematocrit 46.5, platelet count 309  Glucose 90, BUN 15, creatinine 0.98, potassium 4.6, AST 26, ALT 21   TSH 2.65   Hemoglobin A1c 5.4   Continue annual follow-up with gyn   Up-to-date with mammogram  We discussed screening for colon cancer but patient declines at this time   Reviewed vaccinations needed; patient declines     2. Generalized anxiety disorder  Much improved   Continue Trintellix 10 mg daily  - vortioxetine (TRINTELLIX) 10 mg Tab; Take 1 tablet (10 mg total) by mouth once daily.  Dispense: 90 tablet; Refill: 3    3. Major depressive disorder, recurrent episode, mild  Exercise daily. Get sunlight daily.  Practice positive phrases and repeat throughout the day, along with yoga and relaxation techniques.  Establish good social  support, make changes to reduce stress.  Encourage counseling.   Reports any symptoms of suicidal/homicidal ideations or self harm immediately. If clinic is closed, go to nearest emergency room.     - vortioxetine (TRINTELLIX) 10 mg Tab; Take 1 tablet (10 mg total) by mouth once daily.  Dispense: 90 tablet; Refill: 3    4. Primary hypertension  Well controlled today and based on home readings   Refill amlodipine   Has follow-up with Dr. Murillo later this month  - amLODIPine (NORVASC) 5 MG tablet; Take 1 tablet (5 mg total) by mouth once daily.  Dispense: 90 tablet; Refill: 3    5. Class 1 obesity with serious comorbidity and body mass index (BMI) of 32.0 to 32.9 in adult, unspecified obesity type  Body mass index is 32.22 kg/m².  Goal BMI <30.  Exercise 5 times a week for 30 minutes per day.  Avoid soda, simple sugars, excessive rice, potatoes or bread. Limit fast foods and fried foods.  Choose complex carbs in moderation (example: green vegetables, beans, oatmeal). Eat plenty of fresh fruits and vegetables with lean meats daily.  Eat a balanced portion size.  Consider permanent healthy life style changes.      6. Gastroesophageal reflux disease without esophagitis  Preventive Measures:   Avoid spicy, acidic, fried foods and alcohol.  Eat 2-3 hours before going to bed.  Avoid tight clothing, chew food thoroughly.  Reduce caffeine intake, avoid soda.      7. Osteopenia, unspecified location  Last bone density reviewed with patient   Continue calcium with vitamin-D    8. KRUEGER (dyspnea on exertion)  Continue follow-up with cardiology   Refill albuterol as needed  - albuterol (PROVENTIL HFA) 90 mcg/actuation inhaler; Inhale 1-2 puffs into the lungs every 6 (six) hours as needed for Wheezing or Shortness of Breath. Rescue  Dispense: 18 g; Refill: 3    9. Hyperlipidemia, unspecified hyperlipidemia type  Total cholesterol 112, HDL 42, triglycerides 89, LDL 55   Continue rosuvastatin 5 mg nightly         Follow up in about 3  months (around 10/10/2023) for anxiety. or sooner as needed.    Future Appointments   Date Time Provider Department Center   10/10/2023  7:30 AM CHELSEA Azul [facility]   1/4/2024  1:30 PM ANDREW Back [facility] GYN [place] Un         CHELSEA Azul

## 2023-10-11 ENCOUNTER — OFFICE VISIT (OUTPATIENT)
Dept: FAMILY MEDICINE | Facility: CLINIC | Age: 63
End: 2023-10-11
Payer: MEDICAID

## 2023-10-11 VITALS
WEIGHT: 196.63 LBS | BODY MASS INDEX: 32.76 KG/M2 | OXYGEN SATURATION: 99 % | SYSTOLIC BLOOD PRESSURE: 130 MMHG | TEMPERATURE: 98 F | HEART RATE: 67 BPM | DIASTOLIC BLOOD PRESSURE: 78 MMHG | HEIGHT: 65 IN

## 2023-10-11 DIAGNOSIS — F41.1 GENERALIZED ANXIETY DISORDER: Primary | ICD-10-CM

## 2023-10-11 DIAGNOSIS — L82.1 SEBORRHEIC KERATOSES: ICD-10-CM

## 2023-10-11 DIAGNOSIS — I10 PRIMARY HYPERTENSION: ICD-10-CM

## 2023-10-11 DIAGNOSIS — R59.0 LEFT CERVICAL LYMPHADENOPATHY: ICD-10-CM

## 2023-10-11 DIAGNOSIS — F33.0 MAJOR DEPRESSIVE DISORDER, RECURRENT EPISODE, MILD: ICD-10-CM

## 2023-10-11 PROCEDURE — 99214 OFFICE O/P EST MOD 30 MIN: CPT | Mod: ,,, | Performed by: NURSE PRACTITIONER

## 2023-10-11 PROCEDURE — 3044F PR MOST RECENT HEMOGLOBIN A1C LEVEL <7.0%: ICD-10-PCS | Mod: CPTII,,, | Performed by: NURSE PRACTITIONER

## 2023-10-11 PROCEDURE — 3078F DIAST BP <80 MM HG: CPT | Mod: CPTII,,, | Performed by: NURSE PRACTITIONER

## 2023-10-11 PROCEDURE — 3008F BODY MASS INDEX DOCD: CPT | Mod: CPTII,,, | Performed by: NURSE PRACTITIONER

## 2023-10-11 PROCEDURE — 3044F HG A1C LEVEL LT 7.0%: CPT | Mod: CPTII,,, | Performed by: NURSE PRACTITIONER

## 2023-10-11 PROCEDURE — 3075F SYST BP GE 130 - 139MM HG: CPT | Mod: CPTII,,, | Performed by: NURSE PRACTITIONER

## 2023-10-11 PROCEDURE — 3075F PR MOST RECENT SYSTOLIC BLOOD PRESS GE 130-139MM HG: ICD-10-PCS | Mod: CPTII,,, | Performed by: NURSE PRACTITIONER

## 2023-10-11 PROCEDURE — 1160F RVW MEDS BY RX/DR IN RCRD: CPT | Mod: CPTII,,, | Performed by: NURSE PRACTITIONER

## 2023-10-11 PROCEDURE — 3008F PR BODY MASS INDEX (BMI) DOCUMENTED: ICD-10-PCS | Mod: CPTII,,, | Performed by: NURSE PRACTITIONER

## 2023-10-11 PROCEDURE — 1160F PR REVIEW ALL MEDS BY PRESCRIBER/CLIN PHARMACIST DOCUMENTED: ICD-10-PCS | Mod: CPTII,,, | Performed by: NURSE PRACTITIONER

## 2023-10-11 PROCEDURE — 99214 PR OFFICE/OUTPT VISIT, EST, LEVL IV, 30-39 MIN: ICD-10-PCS | Mod: ,,, | Performed by: NURSE PRACTITIONER

## 2023-10-11 PROCEDURE — 1159F MED LIST DOCD IN RCRD: CPT | Mod: CPTII,,, | Performed by: NURSE PRACTITIONER

## 2023-10-11 PROCEDURE — 3078F PR MOST RECENT DIASTOLIC BLOOD PRESSURE < 80 MM HG: ICD-10-PCS | Mod: CPTII,,, | Performed by: NURSE PRACTITIONER

## 2023-10-11 PROCEDURE — 1159F PR MEDICATION LIST DOCUMENTED IN MEDICAL RECORD: ICD-10-PCS | Mod: CPTII,,, | Performed by: NURSE PRACTITIONER

## 2023-10-11 RX ORDER — VORTIOXETINE 20 MG/1
20 TABLET, FILM COATED ORAL DAILY
Qty: 30 TABLET | Refills: 0 | Status: SHIPPED | OUTPATIENT
Start: 2023-10-11 | End: 2023-11-08 | Stop reason: DRUGHIGH

## 2023-10-11 NOTE — PROGRESS NOTES
Patient ID: Martha Merino  : 1960    Chief Complaint: Anxiety    Allergies: Patient is allergic to esomeprazole, omeprazole magnesium, codeine, promethazine-phenyleph-codeine, nexium [esomeprazole magnesium], and prilosec [omeprazole].     History of Present Illness:  The patient is a 63 y.o. White female who presents to clinic for follow up on Anxiety.  Currently struggling with stress and sadness on a daily basis.  Recently experienced the loss of her sister and cousin is in the hospital.  Able to fall asleep but wakes often during the night.  Scheduled in the next 1-2 weeks for follow-up with Cardiology.  She finds that her shortness of breath has improved after beginning Trintellix.  No SI/HI.    She reports a skin lesion to the left breast.  First noticed a few weeks ago.  Not painful.  No trauma to the area or bleeding. No history skin cancer. Last mammogram  normal.    Left anterior cervical lymphadenopathy palpated on exam.  She reports that the lymph node has been enlarged in the past but unable to find ultrasound performed in [place].  No pain with swallowing or sore throat.  No fever or chills.  No night sweats.      Social History:  reports that she has never smoked. She has never been exposed to tobacco smoke. She has never used smokeless tobacco. She reports that she does not drink alcohol and does not use drugs.    Past Medical History:  has a past medical history of Arthritis, GERD (gastroesophageal reflux disease), Hiatal hernia, Hypertension, Obesity, and SOB (shortness of breath) on exertion.    Current Medications:  Current Outpatient Medications   Medication Instructions    albuterol (PROVENTIL HFA) 90 mcg/actuation inhaler 1-2 puffs, Inhalation, Every 6 hours PRN, Rescue    amLODIPine (NORVASC) 5 mg, Oral, Daily    aspirin (ECOTRIN) 81 mg, Oral, Daily    calcium-vitamin D3 (OS-ANGELICA 500 + D3) 500 mg-5 mcg (200 unit) per tablet 1 tablet, Oral, Daily    diclofenac sodium  "(VOLTAREN) 2 g, Topical (Top), 4 times daily PRN    metoprolol tartrate (LOPRESSOR) 12.5 mg, Oral, 2 times daily    nitroGLYCERIN (NITROSTAT) 0.4 mg, Sublingual, As needed (PRN)    rosuvastatin (CRESTOR) 5 mg, Oral, Daily    TRINTELLIX 20 mg, Oral, Daily       ROS: See HPI    Visit Vitals  /78   Pulse 67   Temp 98.2 °F (36.8 °C)   Ht 5' 5" (1.651 m)   Wt 89.2 kg (196 lb 9.6 oz)   SpO2 99%   BMI 32.72 kg/m²       Physical Exam  Vitals reviewed.   Constitutional:       Appearance: Normal appearance.   Cardiovascular:      Rate and Rhythm: Normal rate and regular rhythm.      Heart sounds: Normal heart sounds.   Pulmonary:      Effort: Pulmonary effort is normal.      Breath sounds: Normal breath sounds.   Chest:   Breasts:     Left: No swelling, bleeding, inverted nipple, mass, nipple discharge or tenderness.   Musculoskeletal:      Cervical back: Normal range of motion and neck supple.   Lymphadenopathy:      Cervical: Cervical adenopathy (left anterior without tenderness) present.   Skin:     General: Skin is warm and dry.      Comments: Brown skin lesion measuring 0.7 cm by 0.5 cm to left breast at approximately 4-5 o'clock   Neurological:      Mental Status: She is alert and oriented to person, place, and time. Mental status is at baseline.   Psychiatric:         Mood and Affect: Mood normal.         Thought Content: Thought content normal.         Judgment: Judgment normal.          Labs Reviewed:  Chemistry:  Lab Results   Component Value Date     07/03/2023    K 4.6 07/03/2023    CHLORIDE 105 07/03/2023    BUN 15.0 07/03/2023    CREATININE 0.98 07/03/2023    EGFRNORACEVR 66 07/03/2023    GLUCOSE 90 07/03/2023    CALCIUM 9.6 07/03/2023    ALKPHOS 126 07/03/2023    LABPROT 6.8 07/03/2023    ALBUMIN 4.4 07/03/2023    BILIDIR 0.3 12/16/2021    IBILI 0.40 12/16/2021    AST 26 07/03/2023    ALT 21 07/03/2023    NADIWTUT38KK 23.17 (L) 05/18/2018    TSH 2.650 07/03/2023    GIJJRA2DEZP 0.90 03/09/2021    "     Lab Results   Component Value Date    HGBA1C 5.4 07/03/2023        Hematology:  Lab Results   Component Value Date    WBC 6.21 07/03/2023    RBC 4.95 07/03/2023    HGB 15.3 07/03/2023    HCT 46.5 07/03/2023    MCV 93.9 07/03/2023    MCH 30.9 07/03/2023    MCHC 32.9 07/03/2023    RDW 13.2 07/03/2023     07/03/2023    MPV 11.8 07/03/2023       Lipid Panel:  Lab Results   Component Value Date    CHOL 112 07/03/2023    HDL 42 07/03/2023    DLDL 54.6 07/03/2023    TRIG 89 07/03/2023    TOTALCHOLEST 4 06/20/2022        Assessment & Plan:  1. Generalized anxiety disorder  -     vortioxetine (TRINTELLIX) 20 mg Tab; Take 1 tablet (20 mg total) by mouth once daily.  Dispense: 30 tablet; Refill: 0    2. Major depressive disorder, recurrent episode, mild  Assessment & Plan:  Remains symptomatic  Increase Trintellix to 10 mg daily   ER with SI/HI    Orders:  -     vortioxetine (TRINTELLIX) 20 mg Tab; Take 1 tablet (20 mg total) by mouth once daily.  Dispense: 30 tablet; Refill: 0    3. Primary hypertension  Overview:  Amlodipine 5 mg and metoprolol 12.5 mg b.i.d.      4. Left cervical lymphadenopathy  -     US Soft Tissue Head Neck Thyroid; Future; Expected date: 10/11/2023    5. Seborrheic keratoses  Assessment & Plan:  Encouraged patient to continue to monitor area.  Report any suspicious changes           Future Appointments   Date Time Provider Department Center   11/8/2023  9:00 AM Daisy Cordova FNP [facility]   1/4/2024  1:30 PM Brunilda Landeros, ANP [facility] GYN [place]       Follow up in about 4 weeks (around 11/8/2023) for meds. Call sooner if needed.    CHELSEA Azul    Lab Frequency Next Occurrence   Ambulatory referral/consult to Cardiology Once 02/21/2023

## 2023-10-16 ENCOUNTER — TELEPHONE (OUTPATIENT)
Dept: FAMILY MEDICINE | Facility: CLINIC | Age: 63
End: 2023-10-16
Payer: MEDICAID

## 2023-10-16 RX ORDER — ONDANSETRON 4 MG/1
4 TABLET, ORALLY DISINTEGRATING ORAL EVERY 8 HOURS PRN
Qty: 15 TABLET | Refills: 0 | Status: SHIPPED | OUTPATIENT
Start: 2023-10-16 | End: 2023-11-08

## 2023-10-16 NOTE — TELEPHONE ENCOUNTER
Please reassure her that it will likely get better within the next week or 2.  Have her take it with a large meal and I will send some Zofran to the pharmacy.  Call office with any new issues

## 2023-10-17 ENCOUNTER — HOSPITAL ENCOUNTER (OUTPATIENT)
Dept: RADIOLOGY | Facility: HOSPITAL | Age: 63
Discharge: HOME | End: 2023-10-17
Attending: NURSE PRACTITIONER
Payer: MEDICAID

## 2023-10-17 DIAGNOSIS — R59.0 LEFT CERVICAL LYMPHADENOPATHY: ICD-10-CM

## 2023-10-17 PROCEDURE — 76536 US EXAM OF HEAD AND NECK: CPT | Mod: TC

## 2023-11-08 ENCOUNTER — OFFICE VISIT (OUTPATIENT)
Dept: FAMILY MEDICINE | Facility: CLINIC | Age: 63
End: 2023-11-08
Payer: MEDICAID

## 2023-11-08 VITALS
OXYGEN SATURATION: 99 % | BODY MASS INDEX: 33.05 KG/M2 | WEIGHT: 198.38 LBS | HEIGHT: 65 IN | HEART RATE: 62 BPM | DIASTOLIC BLOOD PRESSURE: 74 MMHG | SYSTOLIC BLOOD PRESSURE: 122 MMHG | TEMPERATURE: 97 F

## 2023-11-08 DIAGNOSIS — I10 PRIMARY HYPERTENSION: ICD-10-CM

## 2023-11-08 DIAGNOSIS — Z12.31 SCREENING MAMMOGRAM FOR BREAST CANCER: ICD-10-CM

## 2023-11-08 DIAGNOSIS — F33.0 MAJOR DEPRESSIVE DISORDER, RECURRENT EPISODE, MILD: ICD-10-CM

## 2023-11-08 DIAGNOSIS — F41.1 GENERALIZED ANXIETY DISORDER: Primary | ICD-10-CM

## 2023-11-08 DIAGNOSIS — R59.0 LEFT CERVICAL LYMPHADENOPATHY: ICD-10-CM

## 2023-11-08 PROCEDURE — 1160F PR REVIEW ALL MEDS BY PRESCRIBER/CLIN PHARMACIST DOCUMENTED: ICD-10-PCS | Mod: CPTII,,, | Performed by: NURSE PRACTITIONER

## 2023-11-08 PROCEDURE — 3074F SYST BP LT 130 MM HG: CPT | Mod: CPTII,,, | Performed by: NURSE PRACTITIONER

## 2023-11-08 PROCEDURE — 3008F PR BODY MASS INDEX (BMI) DOCUMENTED: ICD-10-PCS | Mod: CPTII,,, | Performed by: NURSE PRACTITIONER

## 2023-11-08 PROCEDURE — 3008F BODY MASS INDEX DOCD: CPT | Mod: CPTII,,, | Performed by: NURSE PRACTITIONER

## 2023-11-08 PROCEDURE — 1160F RVW MEDS BY RX/DR IN RCRD: CPT | Mod: CPTII,,, | Performed by: NURSE PRACTITIONER

## 2023-11-08 PROCEDURE — 3078F DIAST BP <80 MM HG: CPT | Mod: CPTII,,, | Performed by: NURSE PRACTITIONER

## 2023-11-08 PROCEDURE — 99214 PR OFFICE/OUTPT VISIT, EST, LEVL IV, 30-39 MIN: ICD-10-PCS | Mod: ,,, | Performed by: NURSE PRACTITIONER

## 2023-11-08 PROCEDURE — 1159F PR MEDICATION LIST DOCUMENTED IN MEDICAL RECORD: ICD-10-PCS | Mod: CPTII,,, | Performed by: NURSE PRACTITIONER

## 2023-11-08 PROCEDURE — 3044F PR MOST RECENT HEMOGLOBIN A1C LEVEL <7.0%: ICD-10-PCS | Mod: CPTII,,, | Performed by: NURSE PRACTITIONER

## 2023-11-08 PROCEDURE — 99214 OFFICE O/P EST MOD 30 MIN: CPT | Mod: ,,, | Performed by: NURSE PRACTITIONER

## 2023-11-08 PROCEDURE — 3044F HG A1C LEVEL LT 7.0%: CPT | Mod: CPTII,,, | Performed by: NURSE PRACTITIONER

## 2023-11-08 PROCEDURE — 3078F PR MOST RECENT DIASTOLIC BLOOD PRESSURE < 80 MM HG: ICD-10-PCS | Mod: CPTII,,, | Performed by: NURSE PRACTITIONER

## 2023-11-08 PROCEDURE — 3074F PR MOST RECENT SYSTOLIC BLOOD PRESSURE < 130 MM HG: ICD-10-PCS | Mod: CPTII,,, | Performed by: NURSE PRACTITIONER

## 2023-11-08 PROCEDURE — 1159F MED LIST DOCD IN RCRD: CPT | Mod: CPTII,,, | Performed by: NURSE PRACTITIONER

## 2023-11-08 RX ORDER — AMLODIPINE BESYLATE 10 MG/1
10 TABLET ORAL
COMMUNITY
Start: 2023-10-16 | End: 2023-11-08

## 2023-11-08 RX ORDER — VORTIOXETINE 10 MG/1
10 TABLET, FILM COATED ORAL DAILY
Qty: 30 TABLET | Refills: 11 | Status: SHIPPED | OUTPATIENT
Start: 2023-11-08 | End: 2024-02-12 | Stop reason: SDUPTHER

## 2023-11-08 NOTE — PROGRESS NOTES
Patient ID: Martha Merino  : 1960    Chief Complaint: Follow-up (1 month )    Allergies: Patient is allergic to esomeprazole, omeprazole magnesium, codeine, promethazine-phenyleph-codeine, nexium [esomeprazole magnesium], and prilosec [omeprazole].     History of Present Illness:  The patient is a 63 y.o. White female who presents to clinic for follow up on Follow-up (1 month ).  At last visit, Trintellix was increased to 20 mg daily.  She did find that her anxiety was improved but felt apathetic.  She also found it worsened her fatigued.  She has been trying to take half a pill per day.  No longer finds anxiety to be as problematic.  Resting well.  No SI/HI.     Cervical lymphadenopathy resolved.  Ultrasound soft tissue of the neck revealed no mass or fluid collection.  Normal-size lymph nodes present in the anterior neck.      Social History:  reports that she has never smoked. She has never been exposed to tobacco smoke. She has never used smokeless tobacco. She reports that she does not drink alcohol and does not use drugs.    Past Medical History:  has a past medical history of Arthritis, GERD (gastroesophageal reflux disease), Hiatal hernia, Hypertension, Obesity, and SOB (shortness of breath) on exertion.    Current Medications:  Current Outpatient Medications   Medication Instructions    albuterol (PROVENTIL HFA) 90 mcg/actuation inhaler 1-2 puffs, Inhalation, Every 6 hours PRN, Rescue    amLODIPine (NORVASC) 5 mg, Oral, Daily    aspirin (ECOTRIN) 81 mg, Oral, Daily    calcium-vitamin D3 (OS-ANGELICA 500 + D3) 500 mg-5 mcg (200 unit) per tablet 1 tablet, Oral, Daily    diclofenac sodium (VOLTAREN) 2 g, Topical (Top), 4 times daily PRN    metoprolol tartrate (LOPRESSOR) 12.5 mg, Oral, 2 times daily    nitroGLYCERIN (NITROSTAT) 0.4 mg, Sublingual, As needed (PRN)    rosuvastatin (CRESTOR) 5 mg, Oral, Daily    TRINTELLIX 10 mg, Oral, Daily       ROS: See HPI    Visit Vitals  /74 (BP Location: Right  "arm, Patient Position: Sitting, BP Method: Medium (Manual))   Pulse 62   Temp 97.2 °F (36.2 °C) (Temporal)   Ht 5' 5" (1.651 m)   Wt 90 kg (198 lb 6.4 oz)   SpO2 99%   BMI 33.02 kg/m²       Physical Exam  Vitals reviewed.   Constitutional:       Appearance: Normal appearance.   Cardiovascular:      Rate and Rhythm: Normal rate and regular rhythm.      Heart sounds: Normal heart sounds.   Pulmonary:      Effort: Pulmonary effort is normal.      Breath sounds: Normal breath sounds.   Musculoskeletal:      Cervical back: Normal range of motion and neck supple. No tenderness.   Lymphadenopathy:      Cervical: No cervical adenopathy.   Skin:     General: Skin is warm and dry.   Neurological:      Mental Status: She is alert and oriented to person, place, and time. Mental status is at baseline.   Psychiatric:         Mood and Affect: Mood normal.         Thought Content: Thought content normal.         Judgment: Judgment normal.          Labs Reviewed:  Chemistry:  Lab Results   Component Value Date     07/03/2023    K 4.6 07/03/2023    CHLORIDE 105 07/03/2023    BUN 15.0 07/03/2023    CREATININE 0.98 07/03/2023    EGFRNORACEVR 66 07/03/2023    GLUCOSE 90 07/03/2023    CALCIUM 9.6 07/03/2023    ALKPHOS 126 07/03/2023    LABPROT 6.8 07/03/2023    ALBUMIN 4.4 07/03/2023    BILIDIR 0.3 12/16/2021    IBILI 0.40 12/16/2021    AST 26 07/03/2023    ALT 21 07/03/2023    TVJQIPWB38AE 23.17 (L) 05/18/2018    TSH 2.650 07/03/2023    OGZTEL7NUOF 0.90 03/09/2021        Lab Results   Component Value Date    HGBA1C 5.4 07/03/2023        Hematology:  Lab Results   Component Value Date    WBC 6.21 07/03/2023    RBC 4.95 07/03/2023    HGB 15.3 07/03/2023    HCT 46.5 07/03/2023    MCV 93.9 07/03/2023    MCH 30.9 07/03/2023    MCHC 32.9 07/03/2023    RDW 13.2 07/03/2023     07/03/2023    MPV 11.8 07/03/2023       Lipid Panel:  Lab Results   Component Value Date    CHOL 112 07/03/2023    HDL 42 07/03/2023    DLDL 54.6 07/03/2023 "    TRIG 89 07/03/2023    TOTALCHOLEST 4 06/20/2022        Assessment & Plan:  1. Generalized anxiety disorder  Assessment & Plan:  Practice deep breathing when anxiety occurs.  Exercise daily. Get sunlight daily.  Avoid caffeine, alcohol and stimulants.  Practice positive phrases and repeat throughout the day, along with yoga and relaxation techniques.  Set healthy boundaries, avoid people and conversations that increase stress.  Reports any suicidal or homicidal ideations immediately. If clinic is closed, go to nearest emergency room.      Orders:  -     vortioxetine (TRINTELLIX) 10 mg Tab; Take 1 tablet (10 mg total) by mouth once daily.  Dispense: 30 tablet; Refill: 11    2. Major depressive disorder, recurrent episode, mild  Assessment & Plan:  Worsened with higher dose of Trintellix  Decrease Trintellix to 10 mg daily      Orders:  -     vortioxetine (TRINTELLIX) 10 mg Tab; Take 1 tablet (10 mg total) by mouth once daily.  Dispense: 30 tablet; Refill: 11    3. Left cervical lymphadenopathy  Assessment & Plan:  Resolved      4. Primary hypertension  Overview:  Amlodipine 5 mg and metoprolol 12.5 mg b.i.d.    Orders:  -     CBC Auto Differential; Future; Expected date: 11/08/2023  -     Comprehensive Metabolic Panel; Future; Expected date: 11/08/2023    5. Screening mammogram for breast cancer  -     Mammo Digital Screening Bilat w/ Gustavo; Future; Expected date: 12/08/2023         Future Appointments   Date Time Provider Department Center   1/4/2024  1:30 PM Brunilda Landeros ANP [facility] GYN [place]   1/9/2024  9:30 AM LAB, [facility] LABORATORY [place]   1/12/2024  8:00 AM Daisy Cordova FNP [facility] [place]       Follow up in about 2 months (around 1/8/2024) for HTN, With Fasting Labs Prior. Call sooner if needed.    CHELSEA Azul    Lab Frequency Next Occurrence   Ambulatory referral/consult to Cardiology Once 02/21/2023   Mammo Digital Screening Bilat w/ Gustavo Once  12/08/2023   CBC Auto Differential Once 11/08/2023   Comprehensive Metabolic Panel Once 11/08/2023

## 2023-11-08 NOTE — ASSESSMENT & PLAN NOTE
Practice deep breathing when anxiety occurs.  Exercise daily. Get sunlight daily.  Avoid caffeine, alcohol and stimulants.  Practice positive phrases and repeat throughout the day, along with yoga and relaxation techniques.  Set healthy boundaries, avoid people and conversations that increase stress.  Reports any suicidal or homicidal ideations immediately. If clinic is closed, go to nearest emergency room.

## 2023-12-11 ENCOUNTER — HOSPITAL ENCOUNTER (OUTPATIENT)
Dept: RADIOLOGY | Facility: HOSPITAL | Age: 63
Discharge: HOME | End: 2023-12-11
Attending: NURSE PRACTITIONER
Payer: MEDICAID

## 2023-12-11 DIAGNOSIS — Z12.31 SCREENING MAMMOGRAM FOR BREAST CANCER: ICD-10-CM

## 2023-12-11 PROCEDURE — 77067 SCR MAMMO BI INCL CAD: CPT | Mod: TC

## 2024-01-10 ENCOUNTER — TELEPHONE (OUTPATIENT)
Dept: FAMILY MEDICINE | Facility: CLINIC | Age: 64
End: 2024-01-10
Payer: MEDICAID

## 2024-01-11 ENCOUNTER — OFFICE VISIT (OUTPATIENT)
Dept: INTERNAL MEDICINE | Facility: CLINIC | Age: 64
End: 2024-01-11
Payer: MEDICAID

## 2024-01-11 VITALS
HEIGHT: 65 IN | HEART RATE: 62 BPM | RESPIRATION RATE: 18 BRPM | SYSTOLIC BLOOD PRESSURE: 138 MMHG | DIASTOLIC BLOOD PRESSURE: 84 MMHG | BODY MASS INDEX: 33.32 KG/M2 | TEMPERATURE: 98 F | WEIGHT: 200 LBS

## 2024-01-11 DIAGNOSIS — Z12.11 COLON CANCER SCREENING: ICD-10-CM

## 2024-01-11 DIAGNOSIS — I10 PRIMARY HYPERTENSION: Primary | ICD-10-CM

## 2024-01-11 DIAGNOSIS — E66.9 CLASS 1 OBESITY WITH SERIOUS COMORBIDITY AND BODY MASS INDEX (BMI) OF 32.0 TO 32.9 IN ADULT, UNSPECIFIED OBESITY TYPE: ICD-10-CM

## 2024-01-11 DIAGNOSIS — K21.9 GASTROESOPHAGEAL REFLUX DISEASE WITHOUT ESOPHAGITIS: ICD-10-CM

## 2024-01-11 DIAGNOSIS — F33.0 MAJOR DEPRESSIVE DISORDER, RECURRENT EPISODE, MILD: ICD-10-CM

## 2024-01-11 DIAGNOSIS — Z00.00 WELL ADULT EXAM: ICD-10-CM

## 2024-01-11 DIAGNOSIS — M19.90 OSTEOARTHRITIS, UNSPECIFIED OSTEOARTHRITIS TYPE, UNSPECIFIED SITE: ICD-10-CM

## 2024-01-11 DIAGNOSIS — F41.1 GENERALIZED ANXIETY DISORDER: ICD-10-CM

## 2024-01-11 PROCEDURE — 3008F BODY MASS INDEX DOCD: CPT | Mod: CPTII,,, | Performed by: NURSE PRACTITIONER

## 2024-01-11 PROCEDURE — 99214 OFFICE O/P EST MOD 30 MIN: CPT | Mod: S$PBB,,, | Performed by: NURSE PRACTITIONER

## 2024-01-11 PROCEDURE — 3079F DIAST BP 80-89 MM HG: CPT | Mod: CPTII,,, | Performed by: NURSE PRACTITIONER

## 2024-01-11 PROCEDURE — 1159F MED LIST DOCD IN RCRD: CPT | Mod: CPTII,,, | Performed by: NURSE PRACTITIONER

## 2024-01-11 PROCEDURE — 1160F RVW MEDS BY RX/DR IN RCRD: CPT | Mod: CPTII,,, | Performed by: NURSE PRACTITIONER

## 2024-01-11 PROCEDURE — 3075F SYST BP GE 130 - 139MM HG: CPT | Mod: CPTII,,, | Performed by: NURSE PRACTITIONER

## 2024-01-11 PROCEDURE — 99214 OFFICE O/P EST MOD 30 MIN: CPT | Mod: PBBFAC | Performed by: NURSE PRACTITIONER

## 2024-01-11 RX ORDER — DICLOFENAC SODIUM 10 MG/G
2 GEL TOPICAL 4 TIMES DAILY PRN
Qty: 100 G | Refills: 3 | Status: SHIPPED | OUTPATIENT
Start: 2024-01-11

## 2024-01-11 NOTE — PROGRESS NOTES
Patient ID: [id number]     Chief Complaint: Establish Care        HPI:     HPI      Marthaunique Merino is a 63 y.o. female here today to establish care. Pt previously followed by Daisy Cordova NP at [facility]. Pt has hx HTN, Depresssion/GARY, GERD, Obesity, Positive FIT 7/2019 however refused colonoscopy,          Immunizations:   Immunization History   Administered Date(s) Administered    COVID-19, MRNA, LN-S, PF (Pfizer) (Purple Cap) 10/25/2021, 11/15/2021    PPD Test 07/07/2015        ----------------------------  Arthritis  GERD (gastroesophageal reflux disease)  Hiatal hernia  Hypertension  Obesity  SOB (shortness of breath) on exertion     Past Surgical History:   Procedure Laterality Date    BIOPSY      Gastrointestinal    CHOLECYSTECTOMY      DXA Bone Density  N/A 11/08/2019    ESOPHAGOGASTRODUODENOSCOPY  03/23/2022    HERNIA REPAIR      LAPAROSCOPIC NISSEN FUNDOPLICATION  06/29/2016    Mammography   12/09/2022    Normal    TUBAL LIGATION         Review of patient's allergies indicates:   Allergen Reactions    Esomeprazole Shortness Of Breath    Omeprazole magnesium Shortness Of Breath    Codeine     Promethazine-phenyleph-codeine      Other reaction(s): patient does not recall reaction    Nexium [esomeprazole magnesium] Nausea And Vomiting    Prilosec [omeprazole] Nausea And Vomiting       Current Outpatient Medications   Medication Instructions    albuterol (PROVENTIL HFA) 90 mcg/actuation inhaler 1-2 puffs, Inhalation, Every 6 hours PRN, Rescue    amLODIPine (NORVASC) 5 mg, Oral, Daily    aspirin (ECOTRIN) 81 mg, Oral, Daily    calcium-vitamin D3 (OS-ANGELICA 500 + D3) 500 mg-5 mcg (200 unit) per tablet 1 tablet, Oral, Daily    diclofenac sodium (VOLTAREN) 2 g, Topical (Top), 4 times daily PRN    metoprolol tartrate (LOPRESSOR) 12.5 mg, Oral, 2 times daily    nitroGLYCERIN (NITROSTAT) 0.4 mg, Sublingual, As needed (PRN)    rosuvastatin (CRESTOR) 5 mg, Oral, Daily    TRINTELLIX 10 mg,  Oral, Daily       Social History     Socioeconomic History    Marital status:      Spouse name: Ramiro    Number of children: 5   Tobacco Use    Smoking status: Never     Passive exposure: Never    Smokeless tobacco: Never   Substance and Sexual Activity    Alcohol use: Never    Drug use: Never    Sexual activity: Not Currently     Social Determinants of Health     Financial Resource Strain: Low Risk  (12/22/2022)    Overall Financial Resource Strain (CARDIA)     Difficulty of Paying Living Expenses: Not very hard   Food Insecurity: No Food Insecurity (12/22/2022)    Hunger Vital Sign     Worried About Running Out of Food in the Last Year: Never true     Ran Out of Food in the Last Year: Never true   Transportation Needs: Unmet Transportation Needs (12/22/2022)    PRAPARE - Transportation     Lack of Transportation (Medical): Yes     Lack of Transportation (Non-Medical): Yes   Physical Activity: Insufficiently Active (12/22/2022)    Exercise Vital Sign     Days of Exercise per Week: 3 days     Minutes of Exercise per Session: 30 min   Stress: No Stress Concern Present (12/22/2022)    [employer] [place] - Occupational Stress Questionnaire     Feeling of Stress : Not at all   Social Connections: Moderately Isolated (12/22/2022)    Social Connection and Isolation Panel [NHANES]     Frequency of Communication with Friends and Family: Once a week     Frequency of Social Gatherings with Friends and Family: Never     Attends [employer] Services: More than 4 times per year     Active Member of Clubs or Organizations: No     Marital Status:    Housing Stability: High Risk (12/22/2022)    Housing Stability Vital Sign     Unable to Pay for Housing in the Last Year: Yes     Number of Places Lived in the Last Year: 1     Unstable Housing in the Last Year: No        Family History   Problem Relation Age of Onset    Hypertension Mother     Alcohol abuse Father     Cancer Father     Hypertension  "Sister     COPD Sister         Patient Care Team:  Dayami Jessica, CHELSEA as PCP - General (Family Medicine)  Brunilda Landeros, ANP as Nurse Practitioner (Gynecology)  Luis Fernando Murillo MD as Consulting Physician (Cardiology)     Subjective:     Review of Systems     See HPI for details    Constitutional: Denies Change in appetite. Denies Chills. Denies Fever. Denies Night sweats.  Eye: Denies Blurred vision. Denies Discharge. Denies Eye pain.  ENT: Denies Decreased hearing. Denies Sore throat. Denies Swollen glands.  Respiratory: Denies Cough. Denies Shortness of breath. Denies Shortness of breath with exertion. Denies Wheezing.  Cardiovascular: DeniesChest pain at rest. Denies Chest pain with exertion. Denies Irregular heartbeat. Denies Palpitations. Denies Edema.  Gastrointestinal: Denies Abdominal pain. DeniesDiarrhea. Denies Nausea. Denies Vomiting. Denies Hematemesis or Hematochezia.  Genitourinary: Denies Dysuria. Denies Urinary frequency. Denies Urinary urgency. Denies Blood in urine.  Endocrine: Denies Cold intolerance. Denies Excessive thirst. Denies Heat intolerance. Denies Weight loss. Denies Weight gain.  Musculoskeletal: Denies Painful joints. Denies Weakness.  Integumentary: Denies Rash. Denies Itching. Denies Dry skin.  Neurologic: Denies Dizziness. Denies Fainting. Denies Headache.  Psychiatric: Denies Depression. Denies Anxiety. Denies Suicidal/Homicidal ideations.    All Other ROS: Negative except as stated in HPI.       Objective:     Visit Vitals  /84 (BP Location: Left arm, Patient Position: Sitting, BP Method: Large (Automatic))   Pulse 62   Temp 97.8 °F (36.6 °C) (Oral)   Resp 18   Ht 5' 5" (1.651 m)   Wt 90.7 kg (200 lb)   BMI 33.28 kg/m²       Physical Exam    General: Alert and oriented, No acute distress.  Head: Normocephalic, Atraumatic.  Eye: Pupils are equal, round and reactive to light, Extraocular movements are intact, Sclera non-icteric.  Ears/Nose/Throat: Normal, Mucosa " moist,Clear.  Neck/Thyroid: Supple, Non-tender, No carotid bruit, No lymphadenopathy, No JVD, Full range of motion.  Respiratory: Clear to auscultation bilaterally; No wheezes, rales or rhonchi,Non-labored respirations, Symmetrical chest wall expansion.  Cardiovascular: Regular rate and rhythm, S1/S2 normal, No murmurs, rubs or gallops.  Gastrointestinal: Soft, Non-tender, Non-distended, Normal bowel sounds, No palpable organomegaly.  Musculoskeletal: Normal range of motion.  Integumentary: Warm, Dry, Intact, No suspicious lesions or rashes.  Extremities: No clubbing, cyanosis or edema  Neurologic: No focal deficits, Cranial Nerves II-XII are grossly intact, Motor strength normal upper and lower extremities, Sensory exam intact.  Psychiatric: Normal interaction, Coherent speech, Euthymic mood, Appropriate affect       Labs Reviewed:     Chemistry:  Lab Results   Component Value Date     07/03/2023    K 4.6 07/03/2023    CHLORIDE 105 07/03/2023    BUN 15.0 07/03/2023    CREATININE 0.98 07/03/2023    EGFRNORACEVR 66 07/03/2023    GLUCOSE 90 07/03/2023    CALCIUM 9.6 07/03/2023    ALKPHOS 126 07/03/2023    LABPROT 6.8 07/03/2023    ALBUMIN 4.4 07/03/2023    BILIDIR 0.3 12/16/2021    IBILI 0.40 12/16/2021    AST 26 07/03/2023    ALT 21 07/03/2023    [id number] 23.17 (L) 05/18/2018        Lab Results   Component Value Date    HGBA1C 5.4 07/03/2023        Hematology:  Lab Results   Component Value Date    WBC 6.21 07/03/2023    HGB 15.3 07/03/2023    HCT 46.5 07/03/2023     07/03/2023       Lipid Panel:  Lab Results   Component Value Date    CHOL 112 07/03/2023    HDL 42 07/03/2023    .00 06/20/2022    TRIG 89 07/03/2023    TOTALCHOLEST 4 06/20/2022        Urine:  Lab Results   Component Value Date    COLORUA Colorless (A) 12/16/2022    APPEARANCEUA Clear 12/16/2022    SGUA 1.012 12/16/2022    PHUA 8.0 12/16/2022    PROTEINUA Negative 12/16/2022    GLUCOSEUA Normal 12/16/2022    KETONESUA Negative  12/16/2022    BLOODUA Negative 12/16/2022    NITRITESUA Negative 12/16/2022    LEUKOCYTESUR Negative 12/16/2022    RBCUA 0-5 12/16/2022    WBCUA 0-5 12/16/2022    BACTERIA Trace (A) 12/16/2022    SQEPUA None Seen 12/16/2022    HYALINECASTS None Seen 12/16/2022    CREATRANDUR 52.0 05/18/2018        Assessment:       ICD-10-CM ICD-9-CM   1. Primary hypertension  I10 401.9   2. Major depressive disorder, recurrent episode, mild  F33.0 296.31   3. Gastroesophageal reflux disease without esophagitis  K21.9 530.81   4. Class 1 obesity with serious comorbidity and body mass index (BMI) of 32.0 to 32.9 in adult, unspecified obesity type  E66.9 278.00    Z68.32 V85.32   5. Well adult exam  Z00.00 V70.0   6. Generalized anxiety disorder  F41.1 300.02        Plan:     1. Primary hypertension  BP controlled. Low fat low salt diet and exercise. Cont Amlodipine, metoprolol as prescribed.     2. Major depressive disorder, recurrent episode, mild  Denies SI/HI. Cont Trintellix, Venlafaxine, escitalopram as prescribed.     3. Gastroesophageal reflux disease without esophagitis  Avoid triggers. Pt allergic to PPI meds. Pt s/p fundiplication sx.     4. Class 1 obesity with serious comorbidity and body mass index (BMI) of 32.0 to 32.9 in adult, unspecified obesity type  Encouraged low fat diet and exercise. Education provided.     5. Well adult exam  Labs in 1 month. Keep GYN cl appt. Keep MMG and US appt as scheduled. Pt had positive FIT 7/2019 however documentation in chart states pt refused colonoscopy.     6. Generalized anxiety disorder  Denies SI/HI.          Follow up in about 1 month (around 2/11/2024) for with labs 1 week prior to appt.. In addition to their scheduled follow up, the patient has also been instructed to follow up on as needed basis.     Future Appointments   Date Time Provider Department Center   1/29/2024  8:00 AM [facility] MAMMO1 [facility] MAMMO [place]    1/29/2024  8:30 AM [facility] MAMMO3 [facility]1 [facility] MAMMO [place]    1/30/2024  8:50 AM Brunilda Landeros, ANP [facility]        CHELSEA [place]

## 2024-01-29 ENCOUNTER — HOSPITAL ENCOUNTER (OUTPATIENT)
Dept: RADIOLOGY | Facility: HOSPITAL | Age: 64
Discharge: HOME | End: 2024-01-29
Attending: NURSE PRACTITIONER
Payer: MEDICAID

## 2024-01-29 DIAGNOSIS — R92.8 ABNORMAL MAMMOGRAM: ICD-10-CM

## 2024-01-29 PROCEDURE — 77061 BREAST TOMOSYNTHESIS UNI: CPT | Mod: TC,RT

## 2024-01-29 PROCEDURE — 77061 BREAST TOMOSYNTHESIS UNI: CPT | Mod: 26,RT,, | Performed by: RADIOLOGY

## 2024-01-29 PROCEDURE — 77065 DX MAMMO INCL CAD UNI: CPT | Mod: 26,RT,, | Performed by: RADIOLOGY

## 2024-01-29 PROCEDURE — 76642 ULTRASOUND BREAST LIMITED: CPT | Mod: TC,RT

## 2024-01-29 PROCEDURE — 76642 ULTRASOUND BREAST LIMITED: CPT | Mod: 26,RT,, | Performed by: RADIOLOGY

## 2024-01-29 PROCEDURE — 77065 DX MAMMO INCL CAD UNI: CPT | Mod: TC,RT

## 2024-01-30 ENCOUNTER — OFFICE VISIT (OUTPATIENT)
Dept: GYNECOLOGY | Facility: CLINIC | Age: 64
End: 2024-01-30
Payer: MEDICAID

## 2024-01-30 VITALS
TEMPERATURE: 98 F | HEART RATE: 67 BPM | WEIGHT: 200.63 LBS | HEIGHT: 65 IN | DIASTOLIC BLOOD PRESSURE: 78 MMHG | SYSTOLIC BLOOD PRESSURE: 134 MMHG | OXYGEN SATURATION: 100 % | BODY MASS INDEX: 33.43 KG/M2

## 2024-01-30 DIAGNOSIS — Z12.4 PAP SMEAR FOR CERVICAL CANCER SCREENING: Primary | ICD-10-CM

## 2024-01-30 PROCEDURE — 3008F BODY MASS INDEX DOCD: CPT | Mod: CPTII,,, | Performed by: NURSE PRACTITIONER

## 2024-01-30 PROCEDURE — 99213 OFFICE O/P EST LOW 20 MIN: CPT | Mod: PBBFAC | Performed by: NURSE PRACTITIONER

## 2024-01-30 PROCEDURE — 88174 CYTOPATH C/V AUTO IN FLUID: CPT | Performed by: NURSE PRACTITIONER

## 2024-01-30 PROCEDURE — 1159F MED LIST DOCD IN RCRD: CPT | Mod: CPTII,,, | Performed by: NURSE PRACTITIONER

## 2024-01-30 PROCEDURE — 3078F DIAST BP <80 MM HG: CPT | Mod: CPTII,,, | Performed by: NURSE PRACTITIONER

## 2024-01-30 PROCEDURE — 99396 PREV VISIT EST AGE 40-64: CPT | Mod: S$PBB,,, | Performed by: NURSE PRACTITIONER

## 2024-01-30 PROCEDURE — 3075F SYST BP GE 130 - 139MM HG: CPT | Mod: CPTII,,, | Performed by: NURSE PRACTITIONER

## 2024-01-30 PROCEDURE — 87624 HPV HI-RISK TYP POOLED RSLT: CPT

## 2024-01-30 NOTE — PROGRESS NOTES
[facility] -  Gynecology / Women's Health Clinic      Subjective:       Patient ID: Martha Merino is a 63 y.o. female.    Chief Complaint:  Gynecologic Exam    History of Present Illness  The patient is  here for annual exam. Denies history of abnormal paps. Last pap -NIL and HPV neg. Pap in  was unsatisfactory. MG-23 & BIRADS 2, with cysts to ulises breast. Denies breast or urinary complaints. Denies pelvic pain, abnormal bleeding or discharge. No c/o hot flashes. Pt reports no STIs in the past and no concerns. Denies tobacco use. Denies fly hx of breast, ovarian, uterine or colon cancer. FIT negative in 2021, declines cologuard/colonoscopy with PCP and again today. Osteopenia, pt takes OTC calcium and vit D supplementation.    GYN & OB History  No LMP recorded. Patient is postmenopausal.   Date of Last Pap: 4/3/2019    Review of patient's allergies indicates:   Allergen Reactions    Esomeprazole Shortness Of Breath    Omeprazole magnesium Shortness Of Breath    Codeine     Promethazine-phenyleph-codeine      Other reaction(s): patient does not recall reaction    Nexium [esomeprazole magnesium] Nausea And Vomiting    Prilosec [omeprazole] Nausea And Vomiting     Past Medical History:   Diagnosis Date    Arthritis     GERD (gastroesophageal reflux disease)     Hiatal hernia     Hypertension     Obesity     SOB (shortness of breath) on exertion      OB History    Para Term  AB Living   5 5           SAB IAB Ectopic Multiple Live Births                  # Outcome Date GA Lbr Rashad/2nd Weight Sex Delivery Anes PTL Lv   5 Para            4 Para            3 Para            2 Para            1 Para                 Review of Systems  Review of Systems    Negative except for pertinent findings for positives per HPI     Objective:    Physical Exam    /78 (BP Location: Right arm, Patient Position: Sitting, BP Method: Large (Automatic))   Pulse 67   Temp 97.7 °F  "(36.5 °C) (Oral)   Ht 5' 5" (1.651 m)   Wt 91 kg (200 lb 9.6 oz)   SpO2 100%   BMI 33.38 kg/m²   GENERAL: Alert and oriented x3. No apparent distress.  BREAST: No mass, tenderness or discharge.   PELVIC:   Labia: No erythema or lesions.  Vagina: atrophic, no abnormal discharge.  Cervix: atrophic with erythema extending from 6-9 o'clock Rt lower cervix, no cervical motion tenderness.  Uterus: Non-tender, 12 weeks  Adnexa: Non-tender, no fullness.  INTEGUMENTARY: Warm and dry.  NEUROLOGIC: She is alert and oriented x3.   PSYCHIATRIC: Cooperative, appropriate mood and affect.    Assessment:       1. Pap smear for cervical cancer screening       Plan:   Martha was seen today for gynecologic exam.    Diagnoses and all orders for this visit:    Pap smear for cervical cancer screening  -     Liquid-Based Pap Smear, Screening Screening    Pap today  Encouraged updated colon cancer screening.  Follow up in about 1 year (around 1/30/2025) for annual exam.    "

## 2024-02-01 LAB — PSYCHE PATHOLOGY RESULT: NORMAL

## 2024-02-12 ENCOUNTER — OFFICE VISIT (OUTPATIENT)
Dept: FAMILY MEDICINE | Facility: CLINIC | Age: 64
End: 2024-02-12
Payer: MEDICAID

## 2024-02-12 VITALS
HEART RATE: 67 BPM | TEMPERATURE: 98 F | DIASTOLIC BLOOD PRESSURE: 82 MMHG | WEIGHT: 200 LBS | HEIGHT: 65 IN | BODY MASS INDEX: 33.32 KG/M2 | SYSTOLIC BLOOD PRESSURE: 132 MMHG | OXYGEN SATURATION: 97 %

## 2024-02-12 DIAGNOSIS — E78.5 HYPERLIPIDEMIA, UNSPECIFIED HYPERLIPIDEMIA TYPE: ICD-10-CM

## 2024-02-12 DIAGNOSIS — F41.1 GENERALIZED ANXIETY DISORDER: Primary | ICD-10-CM

## 2024-02-12 DIAGNOSIS — I10 PRIMARY HYPERTENSION: ICD-10-CM

## 2024-02-12 DIAGNOSIS — F33.0 MAJOR DEPRESSIVE DISORDER, RECURRENT EPISODE, MILD: ICD-10-CM

## 2024-02-12 PROCEDURE — 3075F SYST BP GE 130 - 139MM HG: CPT | Mod: CPTII,,, | Performed by: NURSE PRACTITIONER

## 2024-02-12 PROCEDURE — 1159F MED LIST DOCD IN RCRD: CPT | Mod: CPTII,,, | Performed by: NURSE PRACTITIONER

## 2024-02-12 PROCEDURE — 3008F BODY MASS INDEX DOCD: CPT | Mod: CPTII,,, | Performed by: NURSE PRACTITIONER

## 2024-02-12 PROCEDURE — 1160F RVW MEDS BY RX/DR IN RCRD: CPT | Mod: CPTII,,, | Performed by: NURSE PRACTITIONER

## 2024-02-12 PROCEDURE — 3079F DIAST BP 80-89 MM HG: CPT | Mod: CPTII,,, | Performed by: NURSE PRACTITIONER

## 2024-02-12 PROCEDURE — 99213 OFFICE O/P EST LOW 20 MIN: CPT | Mod: ,,, | Performed by: NURSE PRACTITIONER

## 2024-02-12 RX ORDER — VORTIOXETINE 10 MG/1
10 TABLET, FILM COATED ORAL DAILY
Qty: 30 TABLET | Refills: 11 | Status: SHIPPED | OUTPATIENT
Start: 2024-02-12 | End: 2024-03-13 | Stop reason: SDUPTHER

## 2024-02-12 RX ORDER — AMLODIPINE BESYLATE 5 MG/1
5 TABLET ORAL DAILY
Qty: 90 TABLET | Refills: 3 | Status: SHIPPED | OUTPATIENT
Start: 2024-02-12 | End: 2024-03-13 | Stop reason: SDUPTHER

## 2024-02-12 NOTE — PROGRESS NOTES
"Patient ID: Martha Merino  : 1960    Chief Complaint: Hypertension    Allergies: Patient is allergic to esomeprazole, omeprazole magnesium, codeine, promethazine-phenyleph-codeine, nexium [esomeprazole magnesium], and prilosec [omeprazole].     History of Present Illness:  The patient is a 63 y.o. White female who presents to clinic for follow up on Hypertension.  No complaints or concerns today but would like to reestablish care.  She saw a PCP at [facility] but currently lacks transportation to get back and forth.  Anxiety, depression, and dyspnea have greatly improved after initiation of Trintellix.      Social History:  reports that she has never smoked. She has never been exposed to tobacco smoke. She has never used smokeless tobacco. She reports that she does not drink alcohol and does not use drugs.    Past Medical History:  has a past medical history of Arthritis, GERD (gastroesophageal reflux disease), Hiatal hernia, Hypertension, Obesity, and SOB (shortness of breath) on exertion.    Current Medications:  Current Outpatient Medications   Medication Instructions    albuterol (PROVENTIL HFA) 90 mcg/actuation inhaler 1-2 puffs, Inhalation, Every 6 hours PRN, Rescue    amLODIPine (NORVASC) 5 mg, Oral, Daily    aspirin (ECOTRIN) 81 mg, Oral, Daily    calcium-vitamin D3 (OS-ANGELICA 500 + D3) 500 mg-5 mcg (200 unit) per tablet 1 tablet, Oral, Daily    diclofenac sodium (VOLTAREN) 2 g, Topical (Top), 4 times daily PRN    metoprolol tartrate (LOPRESSOR) 12.5 mg, Oral, 2 times daily    nitroGLYCERIN (NITROSTAT) 0.4 mg, Sublingual, As needed (PRN)    rosuvastatin (CRESTOR) 5 mg, Oral, Daily    TRINTELLIX 10 mg, Oral, Daily       ROS: See HPI    Visit Vitals  /82 (BP Location: Left arm)   Pulse 67   Temp 97.9 °F (36.6 °C) (Temporal)   Ht 5' 5" (1.651 m)   Wt 90.7 kg (200 lb)   SpO2 97%   BMI 33.28 kg/m²       Physical Exam  Vitals reviewed.   Constitutional:       Appearance: Normal appearance.   Cardiovascular: "      Rate and Rhythm: Normal rate and regular rhythm.      Heart sounds: Normal heart sounds.   Pulmonary:      Effort: Pulmonary effort is normal.      Breath sounds: Normal breath sounds.   Skin:     General: Skin is warm and dry.   Neurological:      Mental Status: She is alert and oriented to person, place, and time. Mental status is at baseline.   Psychiatric:         Mood and Affect: Mood normal.         Thought Content: Thought content normal.         Judgment: Judgment normal.          Labs Reviewed:  Chemistry:  Lab Results   Component Value Date     07/03/2023    K 4.6 07/03/2023    CHLORIDE 105 07/03/2023    BUN 15.0 07/03/2023    CREATININE 0.98 07/03/2023    EGFRNORACEVR 66 07/03/2023    GLUCOSE 90 07/03/2023    CALCIUM 9.6 07/03/2023    ALKPHOS 126 07/03/2023    LABPROT 6.8 07/03/2023    ALBUMIN 4.4 07/03/2023    BILIDIR 0.3 12/16/2021    IBILI 0.40 12/16/2021    AST 26 07/03/2023    ALT 21 07/03/2023    JWGUFZCB52DH 23.17 (L) 05/18/2018    TSH 2.650 07/03/2023    HKAQZK7GKMR 0.90 03/09/2021        Lab Results   Component Value Date    HGBA1C 5.4 07/03/2023        Hematology:  Lab Results   Component Value Date    WBC 6.21 07/03/2023    RBC 4.95 07/03/2023    HGB 15.3 07/03/2023    HCT 46.5 07/03/2023    MCV 93.9 07/03/2023    MCH 30.9 07/03/2023    MCHC 32.9 07/03/2023    RDW 13.2 07/03/2023     07/03/2023    MPV 11.8 07/03/2023       Lipid Panel:  Lab Results   Component Value Date    CHOL 112 07/03/2023    HDL 42 07/03/2023    DLDL 54.6 07/03/2023    TRIG 89 07/03/2023    TOTALCHOLEST 4 06/20/2022        Assessment & Plan:  1. Generalized anxiety disorder  Overview:  On Trintellix 10 mg daily    Assessment & Plan:  Well-controlled today   Continue Trintellix    Orders:  -     vortioxetine (TRINTELLIX) 10 mg Tab; Take 1 tablet (10 mg total) by mouth once daily.  Dispense: 30 tablet; Refill: 11    2. Major depressive disorder, recurrent episode, mild  Overview:  On Trintellix 10 mg  daily    Assessment & Plan:  No issues   Continue Trintellix    Orders:  -     vortioxetine (TRINTELLIX) 10 mg Tab; Take 1 tablet (10 mg total) by mouth once daily.  Dispense: 30 tablet; Refill: 11    3. Primary hypertension  Overview:  Amlodipine 5 mg and metoprolol 12.5 mg b.i.d.    Assessment & Plan:  Well-controlled today    Orders:  -     amLODIPine (NORVASC) 5 MG tablet; Take 1 tablet (5 mg total) by mouth once daily.  Dispense: 90 tablet; Refill: 3    4. Hyperlipidemia, unspecified hyperlipidemia type  Overview:  On rosuvastatin 5 mg daily    Assessment & Plan:  FLP and CMP prior to next visit    Orders:  -     Lipid Panel; Future; Expected date: 02/12/2024  -     Comprehensive Metabolic Panel; Future; Expected date: 02/12/2024         Future Appointments   Date Time Provider Department Center   2/19/2024  7:00 AM Dayami Jessica FNP [facility] IRIS [place]   3/6/2024  9:50 AM LAB, [facility] LABORATORY DRAW STATION [facility] KENNY Juarez [facility]   3/13/2024  7:30 AM Daisy Cordova FNP [facility] [place] [facility]   2/3/2025  8:50 AM Brunilda Landeros, ANDREW [facility] GYN [place]        Follow up in about 4 weeks (around 3/11/2024) for choleserol, With Fasting Labs Prior. Call sooner if needed.    CHELSEA Azul    Lab Frequency Next Occurrence   Ambulatory referral/consult to Cardiology Once 02/21/2023   Comprehensive Metabolic Panel Once 11/08/2023   Comprehensive Metabolic Panel Once 01/11/2024   Urinalysis, Reflex to Urine Culture Once 01/11/2024

## 2024-03-06 PROCEDURE — 80061 LIPID PANEL: CPT | Performed by: NURSE PRACTITIONER

## 2024-03-06 PROCEDURE — 80053 COMPREHEN METABOLIC PANEL: CPT | Performed by: NURSE PRACTITIONER

## 2024-03-13 ENCOUNTER — OFFICE VISIT (OUTPATIENT)
Dept: FAMILY MEDICINE | Facility: CLINIC | Age: 64
End: 2024-03-13
Payer: MEDICAID

## 2024-03-13 VITALS
SYSTOLIC BLOOD PRESSURE: 110 MMHG | HEART RATE: 63 BPM | DIASTOLIC BLOOD PRESSURE: 80 MMHG | WEIGHT: 200 LBS | HEIGHT: 65 IN | BODY MASS INDEX: 33.32 KG/M2 | TEMPERATURE: 98 F | OXYGEN SATURATION: 98 %

## 2024-03-13 DIAGNOSIS — E78.5 HYPERLIPIDEMIA, UNSPECIFIED HYPERLIPIDEMIA TYPE: Primary | ICD-10-CM

## 2024-03-13 DIAGNOSIS — Z00.00 WELLNESS EXAMINATION: ICD-10-CM

## 2024-03-13 DIAGNOSIS — G47.00 INSOMNIA, UNSPECIFIED TYPE: ICD-10-CM

## 2024-03-13 DIAGNOSIS — F33.0 MAJOR DEPRESSIVE DISORDER, RECURRENT EPISODE, MILD: ICD-10-CM

## 2024-03-13 DIAGNOSIS — R06.09 DOE (DYSPNEA ON EXERTION): ICD-10-CM

## 2024-03-13 DIAGNOSIS — M85.80 OSTEOPENIA, UNSPECIFIED LOCATION: ICD-10-CM

## 2024-03-13 DIAGNOSIS — I10 PRIMARY HYPERTENSION: ICD-10-CM

## 2024-03-13 DIAGNOSIS — F41.1 GENERALIZED ANXIETY DISORDER: ICD-10-CM

## 2024-03-13 PROCEDURE — 3079F DIAST BP 80-89 MM HG: CPT | Mod: CPTII,,, | Performed by: NURSE PRACTITIONER

## 2024-03-13 PROCEDURE — 3008F BODY MASS INDEX DOCD: CPT | Mod: CPTII,,, | Performed by: NURSE PRACTITIONER

## 2024-03-13 PROCEDURE — 99214 OFFICE O/P EST MOD 30 MIN: CPT | Mod: ,,, | Performed by: NURSE PRACTITIONER

## 2024-03-13 PROCEDURE — 1159F MED LIST DOCD IN RCRD: CPT | Mod: CPTII,,, | Performed by: NURSE PRACTITIONER

## 2024-03-13 PROCEDURE — 1160F RVW MEDS BY RX/DR IN RCRD: CPT | Mod: CPTII,,, | Performed by: NURSE PRACTITIONER

## 2024-03-13 PROCEDURE — 3074F SYST BP LT 130 MM HG: CPT | Mod: CPTII,,, | Performed by: NURSE PRACTITIONER

## 2024-03-13 RX ORDER — AMLODIPINE BESYLATE 5 MG/1
5 TABLET ORAL DAILY
Qty: 90 TABLET | Refills: 3 | Status: SHIPPED | OUTPATIENT
Start: 2024-03-13 | End: 2024-06-19

## 2024-03-13 RX ORDER — ALBUTEROL SULFATE 90 UG/1
1-2 AEROSOL, METERED RESPIRATORY (INHALATION) EVERY 6 HOURS PRN
Qty: 18 G | Refills: 3 | Status: SHIPPED | OUTPATIENT
Start: 2024-03-13 | End: 2024-05-02 | Stop reason: SDUPTHER

## 2024-03-13 RX ORDER — METOPROLOL TARTRATE 25 MG/1
12.5 TABLET, FILM COATED ORAL 2 TIMES DAILY
Qty: 90 TABLET | Refills: 3 | Status: SHIPPED | OUTPATIENT
Start: 2024-03-13 | End: 2025-03-13

## 2024-03-13 RX ORDER — ROSUVASTATIN CALCIUM 5 MG/1
5 TABLET, COATED ORAL DAILY
Qty: 90 TABLET | Refills: 3 | Status: SHIPPED | OUTPATIENT
Start: 2024-03-13 | End: 2025-03-13

## 2024-03-13 RX ORDER — VORTIOXETINE 10 MG/1
10 TABLET, FILM COATED ORAL DAILY
Qty: 90 TABLET | Refills: 3 | Status: SHIPPED | OUTPATIENT
Start: 2024-03-13 | End: 2025-03-13

## 2024-03-13 NOTE — ASSESSMENT & PLAN NOTE
Sleep hygiene should include: Regular sleep schedule, optimal sleep environment (dark room, lights out, no TV or Radio), relaxing pre-sleep ritual, avoid day time naps, no caffeine after noon, no excessive alcohol intake, no tobacco before bed time, and regular daily exercise.   Declines medication

## 2024-03-13 NOTE — ASSESSMENT & PLAN NOTE
Lipid Panel:  Lab Results   Component Value Date    CHOL 120 03/06/2024    HDL 51 03/06/2024    DLDL 62.4 03/06/2024    TRIG 83 03/06/2024    TOTALCHOLEST 4 06/20/2022     Continue rosuvastatin

## 2024-03-13 NOTE — PROGRESS NOTES
"Patient ID: Martha Merino  : 1960    Chief Complaint: Hyperlipidemia    Allergies: Patient is allergic to esomeprazole, omeprazole magnesium, codeine, promethazine-phenyleph-codeine, nexium [esomeprazole magnesium], and prilosec [omeprazole].     History of Present Illness:  The patient is a 63 y.o. White female who presents to clinic for follow up on Hyperlipidemia. Requesting refills on all medications today.     She complains of the inability to fall asleep. Attributes to anxiety and worry. She often finds herself short of breath at night when she can't sleep. Resolves with single inhalation of albuterol.      Social History:  reports that she has never smoked. She has never been exposed to tobacco smoke. She has never used smokeless tobacco. She reports that she does not drink alcohol and does not use drugs.    Past Medical History:  has a past medical history of Arthritis, GERD (gastroesophageal reflux disease), Hiatal hernia, Hypertension, Obesity, and SOB (shortness of breath) on exertion.    Current Medications:  Current Outpatient Medications   Medication Instructions    albuterol (PROVENTIL HFA) 90 mcg/actuation inhaler 1-2 puffs, Inhalation, Every 6 hours PRN, Rescue    amLODIPine (NORVASC) 5 mg, Oral, Daily    aspirin (ECOTRIN) 81 mg, Oral, Daily    calcium-vitamin D3 (OS-ANGELICA 500 + D3) 500 mg-5 mcg (200 unit) per tablet 1 tablet, Oral, Daily    diclofenac sodium (VOLTAREN) 2 g, Topical (Top), 4 times daily PRN    metoprolol tartrate (LOPRESSOR) 12.5 mg, Oral, 2 times daily    nitroGLYCERIN (NITROSTAT) 0.4 mg, Sublingual, As needed (PRN)    rosuvastatin (CRESTOR) 5 mg, Oral, Daily    TRINTELLIX 10 mg, Oral, Daily       ROS: See HPI    Visit Vitals  /80 (BP Location: Right arm)   Pulse 63   Temp 98.4 °F (36.9 °C) (Temporal)   Ht 5' 5" (1.651 m)   Wt 90.7 kg (200 lb)   SpO2 98%   BMI 33.28 kg/m²       Physical Exam  Vitals reviewed.   Constitutional:       Appearance: Normal appearance. "   Cardiovascular:      Rate and Rhythm: Normal rate and regular rhythm.      Heart sounds: Normal heart sounds.   Pulmonary:      Effort: Pulmonary effort is normal.      Breath sounds: Normal breath sounds.   Abdominal:      General: Bowel sounds are normal.      Palpations: Abdomen is soft.      Tenderness: There is no abdominal tenderness.   Skin:     General: Skin is warm and dry.   Neurological:      Mental Status: She is alert and oriented to person, place, and time. Mental status is at baseline.   Psychiatric:         Mood and Affect: Mood normal.         Thought Content: Thought content normal.         Judgment: Judgment normal.            Assessment & Plan:  1. Hyperlipidemia, unspecified hyperlipidemia type  Overview:  On rosuvastatin 5 mg daily    Assessment & Plan:  Lipid Panel:  Lab Results   Component Value Date    CHOL 120 03/06/2024    HDL 51 03/06/2024    DLDL 62.4 03/06/2024    TRIG 83 03/06/2024    TOTALCHOLEST 4 06/20/2022     Continue rosuvastatin    Orders:  -     rosuvastatin (CRESTOR) 5 MG tablet; Take 1 tablet (5 mg total) by mouth once daily.  Dispense: 90 tablet; Refill: 3    2. Insomnia, unspecified type  Assessment & Plan:  Sleep hygiene should include: Regular sleep schedule, optimal sleep environment (dark room, lights out, no TV or Radio), relaxing pre-sleep ritual, avoid day time naps, no caffeine after noon, no excessive alcohol intake, no tobacco before bed time, and regular daily exercise.   Declines medication       3. Osteopenia, unspecified location  Overview:  On calcium with vitamin-D    Assessment & Plan:  Obtain CT bone density     Orders:  -     CT Bone Density Study 1 + Sites Axial; Future; Expected date: 03/13/2024    4. KRUEGER (dyspnea on exertion)  Overview:  Work-up has included:  Echocardiogram on April 20, 2021 which revealed an ejection fraction of approximately 50 to 55% with trace pericardial effusion without hemodynamic significance. She completed an Exercise  Stress echocardiogram on February 26, 2021 which was negative for ischemia.   CXR 3/17/21: negative      Orders:  -     albuterol (PROVENTIL HFA) 90 mcg/actuation inhaler; Inhale 1-2 puffs into the lungs every 6 (six) hours as needed for Wheezing or Shortness of Breath. Rescue  Dispense: 18 g; Refill: 3    5. Primary hypertension  Overview:  Amlodipine 5 mg and metoprolol 12.5 mg b.i.d.    Orders:  -     metoprolol tartrate (LOPRESSOR) 25 MG tablet; Take 0.5 tablets (12.5 mg total) by mouth 2 (two) times daily.  Dispense: 90 tablet; Refill: 3  -     amLODIPine (NORVASC) 5 MG tablet; Take 1 tablet (5 mg total) by mouth once daily.  Dispense: 90 tablet; Refill: 3    6. Generalized anxiety disorder  Overview:  On Trintellix 10 mg daily    Orders:  -     vortioxetine (TRINTELLIX) 10 mg Tab; Take 1 tablet (10 mg total) by mouth once daily.  Dispense: 90 tablet; Refill: 3    7. Major depressive disorder, recurrent episode, mild  Overview:  On Trintellix 10 mg daily    Orders:  -     vortioxetine (TRINTELLIX) 10 mg Tab; Take 1 tablet (10 mg total) by mouth once daily.  Dispense: 90 tablet; Refill: 3         Future Appointments   Date Time Provider Department Center   7/17/2024  8:50 AM LAB, [facility] LABORATORY DRAW STATION [facility] KENNY Juarez [facility]   7/22/2024 10:00 AM Daisy Cordova FNP [facility]   2/3/2025  8:50 AM Brunilda Landeros, ANP [facility]       Follow up in about 4 months (around 7/13/2024) for Wellness, With Fasting Labs Prior. Call sooner if needed.    CHELSEA Azlu    Lab Frequency Next Occurrence   Ambulatory referral/consult to Cardiology Once 02/21/2023   CT Bone Density Study 1 + Sites Axial Once 03/13/2024

## 2024-03-14 DIAGNOSIS — Z12.9 CANCER SCREENING: Primary | ICD-10-CM

## 2024-03-14 DIAGNOSIS — Z12.11 COLON CANCER SCREENING: ICD-10-CM

## 2024-03-20 ENCOUNTER — HOSPITAL ENCOUNTER (OUTPATIENT)
Dept: RADIOLOGY | Facility: HOSPITAL | Age: 64
Discharge: HOME | End: 2024-03-20
Attending: NURSE PRACTITIONER
Payer: MEDICAID

## 2024-03-20 DIAGNOSIS — M85.80 OSTEOPENIA, UNSPECIFIED LOCATION: ICD-10-CM

## 2024-03-20 PROCEDURE — 77078 CT BONE DENSITY AXIAL: CPT | Mod: TC

## 2024-03-21 ENCOUNTER — TELEPHONE (OUTPATIENT)
Dept: FAMILY MEDICINE | Facility: CLINIC | Age: 64
End: 2024-03-21
Payer: MEDICAID

## 2024-03-21 NOTE — TELEPHONE ENCOUNTER
----- Message from CHELSEA Azul sent at 3/21/2024 12:57 PM CDT -----  Please let her know that she still has osteopenia (mild thinning of the bones).  Continue over-the-counter calcium with vitamin-D

## 2024-03-24 ENCOUNTER — HOSPITAL ENCOUNTER (EMERGENCY)
Facility: HOSPITAL | Age: 64
Discharge: HOME | End: 2024-03-24
Attending: INTERNAL MEDICINE
Payer: MEDICAID

## 2024-03-24 VITALS
HEIGHT: 65 IN | WEIGHT: 194 LBS | SYSTOLIC BLOOD PRESSURE: 102 MMHG | OXYGEN SATURATION: 95 % | RESPIRATION RATE: 18 BRPM | BODY MASS INDEX: 32.32 KG/M2 | TEMPERATURE: 99 F | HEART RATE: 92 BPM | DIASTOLIC BLOOD PRESSURE: 65 MMHG

## 2024-03-24 DIAGNOSIS — R11.10 VOMITING AND DIARRHEA: Primary | ICD-10-CM

## 2024-03-24 DIAGNOSIS — K52.9 COLITIS: ICD-10-CM

## 2024-03-24 DIAGNOSIS — R19.7 VOMITING AND DIARRHEA: Primary | ICD-10-CM

## 2024-03-24 LAB
ALBUMIN SERPL-MCNC: 4.7 G/DL (ref 3.4–5)
ALBUMIN/GLOB SERPL: 1.4 RATIO
ALP SERPL-CCNC: 144 UNIT/L (ref 50–144)
ALT SERPL-CCNC: 37 UNIT/L (ref 1–45)
ANION GAP SERPL CALC-SCNC: 12 MEQ/L (ref 2–13)
APPEARANCE UR: CLEAR
AST SERPL-CCNC: 48 UNIT/L (ref 14–36)
BASOPHILS # BLD AUTO: 0.01 X10(3)/MCL (ref 0.01–0.08)
BASOPHILS NFR BLD AUTO: 0.1 % (ref 0.1–1.2)
BILIRUB SERPL-MCNC: 0.6 MG/DL (ref 0–1)
BILIRUB UR QL STRIP.AUTO: NEGATIVE
BUN SERPL-MCNC: 16 MG/DL (ref 7–20)
CALCIUM SERPL-MCNC: 9.3 MG/DL (ref 8.4–10.2)
CHLORIDE SERPL-SCNC: 106 MMOL/L (ref 98–110)
CO2 SERPL-SCNC: 18 MMOL/L (ref 21–32)
COLOR UR AUTO: YELLOW
CREAT SERPL-MCNC: 0.94 MG/DL (ref 0.66–1.25)
CREAT/UREA NIT SERPL: 17 (ref 12–20)
EOSINOPHIL # BLD AUTO: 0 X10(3)/MCL (ref 0.04–0.36)
EOSINOPHIL NFR BLD AUTO: 0 % (ref 0.7–7)
ERYTHROCYTE [DISTWIDTH] IN BLOOD BY AUTOMATED COUNT: 13.1 % (ref 11–14.5)
GFR SERPLBLD CREATININE-BSD FMLA CKD-EPI: 69 MLS/MIN/1.73/M2
GLOBULIN SER-MCNC: 3.4 GM/DL (ref 2–3.9)
GLUCOSE SERPL-MCNC: 112 MG/DL (ref 70–115)
GLUCOSE UR QL STRIP.AUTO: NEGATIVE
HCT VFR BLD AUTO: 49.1 % (ref 36–48)
HGB BLD-MCNC: 16.4 G/DL (ref 11.8–16)
IMM GRANULOCYTES # BLD AUTO: 0.02 X10(3)/MCL (ref 0–0.03)
IMM GRANULOCYTES NFR BLD AUTO: 0.2 % (ref 0–0.5)
KETONES UR QL STRIP.AUTO: 15
LEUKOCYTE ESTERASE UR QL STRIP.AUTO: NEGATIVE
LIPASE SERPL-CCNC: 63 U/L (ref 23–300)
LYMPHOCYTES # BLD AUTO: 0.66 X10(3)/MCL (ref 1.16–3.74)
LYMPHOCYTES NFR BLD AUTO: 8 % (ref 20–55)
MCH RBC QN AUTO: 31.4 PG (ref 27–34)
MCHC RBC AUTO-ENTMCNC: 33.4 G/DL (ref 31–37)
MCV RBC AUTO: 94.1 FL (ref 79–99)
MONOCYTES # BLD AUTO: 0.75 X10(3)/MCL (ref 0.24–0.36)
MONOCYTES NFR BLD AUTO: 9.1 % (ref 4.7–12.5)
NEUTROPHILS # BLD AUTO: 6.8 X10(3)/MCL (ref 1.56–6.13)
NEUTROPHILS NFR BLD AUTO: 82.6 % (ref 37–73)
NITRITE UR QL STRIP.AUTO: NEGATIVE
NRBC BLD AUTO-RTO: 0 %
PH UR STRIP.AUTO: 6 [PH]
PLATELET # BLD AUTO: 261 X10(3)/MCL (ref 140–371)
PMV BLD AUTO: 10.7 FL (ref 9.4–12.4)
POTASSIUM SERPL-SCNC: 3.9 MMOL/L (ref 3.5–5.1)
PROT SERPL-MCNC: 8.1 GM/DL (ref 6.3–8.2)
PROT UR QL STRIP.AUTO: NEGATIVE
RBC # BLD AUTO: 5.22 X10(6)/MCL (ref 4–5.1)
RBC UR QL AUTO: NEGATIVE
SODIUM SERPL-SCNC: 136 MMOL/L (ref 135–145)
SP GR UR STRIP.AUTO: 1.01 (ref 1–1.03)
UROBILINOGEN UR STRIP-ACNC: 0.2
WBC # SPEC AUTO: 8.24 X10(3)/MCL (ref 4–11.5)

## 2024-03-24 PROCEDURE — 99285 EMERGENCY DEPT VISIT HI MDM: CPT | Mod: 25

## 2024-03-24 PROCEDURE — 96374 THER/PROPH/DIAG INJ IV PUSH: CPT

## 2024-03-24 PROCEDURE — 80053 COMPREHEN METABOLIC PANEL: CPT | Performed by: INTERNAL MEDICINE

## 2024-03-24 PROCEDURE — 25500020 PHARM REV CODE 255: Performed by: INTERNAL MEDICINE

## 2024-03-24 PROCEDURE — 85025 COMPLETE CBC W/AUTO DIFF WBC: CPT | Performed by: INTERNAL MEDICINE

## 2024-03-24 PROCEDURE — 81003 URINALYSIS AUTO W/O SCOPE: CPT | Performed by: INTERNAL MEDICINE

## 2024-03-24 PROCEDURE — 83690 ASSAY OF LIPASE: CPT | Performed by: INTERNAL MEDICINE

## 2024-03-24 PROCEDURE — 63600175 PHARM REV CODE 636 W HCPCS: Performed by: INTERNAL MEDICINE

## 2024-03-24 PROCEDURE — 25000003 PHARM REV CODE 250: Performed by: INTERNAL MEDICINE

## 2024-03-24 PROCEDURE — 96361 HYDRATE IV INFUSION ADD-ON: CPT

## 2024-03-24 RX ORDER — KETOROLAC TROMETHAMINE 30 MG/ML
30 INJECTION, SOLUTION INTRAMUSCULAR; INTRAVENOUS
Status: COMPLETED | OUTPATIENT
Start: 2024-03-24 | End: 2024-03-24

## 2024-03-24 RX ORDER — KETOROLAC TROMETHAMINE 10 MG/1
10 TABLET, FILM COATED ORAL EVERY 6 HOURS
Qty: 20 TABLET | Refills: 0 | Status: SHIPPED | OUTPATIENT
Start: 2024-03-24 | End: 2024-03-29

## 2024-03-24 RX ORDER — ONDANSETRON 4 MG/1
4 TABLET, ORALLY DISINTEGRATING ORAL
Status: COMPLETED | OUTPATIENT
Start: 2024-03-24 | End: 2024-03-24

## 2024-03-24 RX ORDER — LEVOFLOXACIN 750 MG/1
750 TABLET ORAL DAILY
Qty: 7 TABLET | Refills: 0 | Status: SHIPPED | OUTPATIENT
Start: 2024-03-24

## 2024-03-24 RX ORDER — ONDANSETRON 4 MG/1
4 TABLET, ORALLY DISINTEGRATING ORAL EVERY 6 HOURS PRN
Qty: 20 TABLET | Refills: 0 | Status: SHIPPED | OUTPATIENT
Start: 2024-03-24

## 2024-03-24 RX ADMIN — KETOROLAC TROMETHAMINE 30 MG: 30 INJECTION, SOLUTION INTRAMUSCULAR at 08:03

## 2024-03-24 RX ADMIN — SODIUM CHLORIDE 1000 ML: 9 INJECTION, SOLUTION INTRAVENOUS at 07:03

## 2024-03-24 RX ADMIN — IOHEXOL 90 ML: 300 INJECTION, SOLUTION INTRAVENOUS at 08:03

## 2024-03-24 RX ADMIN — ONDANSETRON 4 MG: 4 TABLET, ORALLY DISINTEGRATING ORAL at 07:03

## 2024-03-24 NOTE — ED PROVIDER NOTES
Encounter Date: 3/24/2024       History     Chief Complaint   Patient presents with    Diarrhea     Complains of n/v/d onset yesterday with headache. Denies abdominal pain. Denies urinary complaints.     This is a 63-year-old female patient came into the emergency room with history of having nausea, vomiting, diarrhea since yesterday evening.  Reports having multiple episodes of loose watery stool and does not have any blood.  Denies any abdominal pain.  Denies any intake of solid food.  No fever.  No flank pain.  No dysuria.  No blood in the stool.  No hematemesis.  Patient had cholecystectomy in the past.  No chest pain shortness of breath or palpitations.  Had prior abdominal hernia repair.        Review of patient's allergies indicates:   Allergen Reactions    Esomeprazole Shortness Of Breath    Omeprazole magnesium Shortness Of Breath    Codeine     Promethazine-phenyleph-codeine      Other reaction(s): patient does not recall reaction    Nexium [esomeprazole magnesium] Nausea And Vomiting    Prilosec [omeprazole] Nausea And Vomiting     Past Medical History:   Diagnosis Date    Arthritis     GERD (gastroesophageal reflux disease)     Hiatal hernia     Hypertension     Obesity     SOB (shortness of breath) on exertion      Past Surgical History:   Procedure Laterality Date    BIOPSY      Gastrointestinal    CHOLECYSTECTOMY      DXA Bone Density  N/A 11/08/2019    ESOPHAGOGASTRODUODENOSCOPY  03/23/2022    HERNIA REPAIR      LAPAROSCOPIC NISSEN FUNDOPLICATION  06/29/2016    Mammography   12/09/2022    Normal    TUBAL LIGATION       Family History   Problem Relation Age of Onset    Hypertension Mother     Alcohol abuse Father     Cancer Father     Hypertension Sister     COPD Sister      Social History     Tobacco Use    Smoking status: Never     Passive exposure: Never    Smokeless tobacco: Never   Substance Use Topics    Alcohol use: Never    Drug use: Never     Review of Systems   Constitutional:  Positive for  fatigue. Negative for fever.   HENT: Negative.     Eyes: Negative.    Respiratory:  Negative for cough, shortness of breath and wheezing.    Cardiovascular:  Negative for chest pain, palpitations and leg swelling.   Gastrointestinal:  Positive for diarrhea, nausea and vomiting. Negative for abdominal pain.   Genitourinary:  Negative for dysuria, flank pain, frequency and pelvic pain.   Musculoskeletal:  Negative for back pain and myalgias.   Skin:  Negative for rash.   Neurological:  Negative for seizures and headaches.   All other systems reviewed and are negative.      Physical Exam     Initial Vitals [03/24/24 0728]   BP Pulse Resp Temp SpO2   (!) 158/89 (!) 116 18 98.5 °F (36.9 °C) 96 %      MAP       --         Physical Exam    Nursing note and vitals reviewed.  Constitutional: She appears well-developed and well-nourished.   Eyes: Conjunctivae and EOM are normal. Pupils are equal, round, and reactive to light.   Neck: Neck supple.   Normal range of motion.  Cardiovascular:  Normal rate, regular rhythm, normal heart sounds and intact distal pulses.           Pulmonary/Chest: Breath sounds normal.   Abdominal: Abdomen is soft. Bowel sounds are normal.   Musculoskeletal:         General: Normal range of motion.      Cervical back: Normal range of motion and neck supple.     Neurological: She is alert and oriented to person, place, and time. She has normal strength. GCS score is 15. GCS eye subscore is 4. GCS verbal subscore is 5. GCS motor subscore is 6.   Skin: Skin is warm and dry. Capillary refill takes less than 2 seconds.         ED Course   Procedures  Labs Reviewed   COMPREHENSIVE METABOLIC PANEL - Abnormal; Notable for the following components:       Result Value    Carbon Dioxide 18 (*)     Aspartate Aminotransferase 48 (*)     All other components within normal limits   URINALYSIS, REFLEX TO URINE CULTURE - Abnormal; Notable for the following components:    Ketones, UA 15 (*)     All other components  within normal limits    Narrative:      URINE STABILITY IS 2 HOURS AT ROOM TEMP OR    SIX HOURS REFRIGERATED. PERFORMING TESTING ON    SPECIMENS GREATER THAN THIS AGE MAY AFFECT THE    FOLLOWING TESTS:    PH          SPECIFIC GRAVITY           BLOOD    CLARITY     BILIRUBIN               UROBILINOGEN   CBC WITH DIFFERENTIAL - Abnormal; Notable for the following components:    RBC 5.22 (*)     Hgb 16.4 (*)     Hct 49.1 (*)     Neut % 82.6 (*)     Lymph % 8.0 (*)     Eos % 0.0 (*)     Lymph # 0.66 (*)     Neut # 6.80 (*)     Mono # 0.75 (*)     Eos # 0.00 (*)     All other components within normal limits   LIPASE - Normal   CBC W/ AUTO DIFFERENTIAL    Narrative:     The following orders were created for panel order CBC W/ AUTO DIFFERENTIAL.  Procedure                               Abnormality         Status                     ---------                               -----------         ------                     CBC with Differential[[id number]]       Abnormal            Final result                 Please view results for these tests on the individual orders.          Imaging Results              CT Abdomen Pelvis With IV Contrast NO Oral Contrast (Final result)  Result time 03/24/24 08:50:20      Final result by Cr Philippe MD (03/24/24 08:50:20)                   Impression:      1. Concern for colitis/diarrheal state.  2. Nonobstructing renal calculi.  3. Other secondary findings as noted.      Electronically signed by: Cr Philippe MD  Date:    03/24/2024  Time:    08:50               Narrative:    EXAMINATION:  CT ABDOMEN PELVIS WITH IV CONTRAST    CLINICAL HISTORY:  Vomiting and diarrhea;    TECHNIQUE:  Multiple cross-sectional images were obtained the lung bases the pubic symphysis after the intravenous administration 100 mL of Omnipaque 300.  Coronal and sagittal reformatted images were obtained.  An automated dose exposure technique was utilized this limits radiation does the  patient.    COMPARISON:  None    FINDINGS:  Dependent atelectatic changes lungs with bibasilar scarring and traction bronchiectasis.  Heart size is enlarged.    The liver, spleen with associated splenules, adrenals, and pancreas are normal.  Gallbladder is surgically absent with physiologically dilated common duct.  Kidneys are symmetric in size with nonobstructing bilateral renal calculi.    Moderate hiatal hernia small bowel is of normal caliber.  Colon is of normal caliber with scattered colonic diverticula.  Mild wall thickening is identified with diffuse air-fluid levels throughout the entire length of the colon.  Normal appendix.    The uterus is anteverted.  Nose for adnexal mass.  The bladder is under distended normal.  No free fluid in the pelvis.  Course and caliber of the abdominal is normal with scattered calcified atheromatous disease.  No free fluid in the pelvis.  Nose for adenopathy.    No suspicious osseous lesions.  Scattered spondylotic changes are identified.  Soft tissues are grossly normal.                                    X-Rays:   Independently Interpreted Readings:   Other Readings:  CT abdomen and pelvis shows:  Impression:   1. Concern for colitis/diarrheal state.  2. Nonobstructing renal calculi.  3. Other secondary findings as noted.           Medications   sodium chloride 0.9% bolus 1,000 mL 1,000 mL (0 mLs Intravenous Stopped 3/24/24 0843)   ondansetron disintegrating tablet 4 mg (4 mg Oral Given 3/24/24 0741)   iohexoL (OMNIPAQUE 300) injection 100 mL (90 mLs Intravenous Given 3/24/24 0825)   ketorolac injection 30 mg (30 mg Intravenous Given 3/24/24 0835)     Medical Decision Making  This is a 63-year-old female patient came into the emergency room with history of having nausea, vomiting, diarrhea since yesterday evening.  Reports having multiple episodes of loose watery stool and does not have any blood.  Denies any abdominal pain.  Denies any intake of solid food.  No fever.  No  flank pain.  No dysuria.  No blood in the stool.  No hematemesis.  Patient had cholecystectomy in the past.  No chest pain shortness of breath or palpitations.  Had prior abdominal hernia repair.    CT abdomen and pelvis shows:  Impression:   1. Concern for colitis/diarrheal state.  2. Nonobstructing renal calculi.  3. Other secondary findings as noted.    Patient has been given Zofran, IV fluids.    Advised to take medications as prescribed and follow up with primary medical doctor.    Diagnosis: 1.  Gastroenteritis 2. Colitis 3.  UTI 4. Bowel obstruction    Amount and/or Complexity of Data Reviewed  Labs: ordered.  Radiology: ordered.    Risk  Prescription drug management.                                      Clinical Impression:  Final diagnoses:  [R11.10, R19.7] Vomiting and diarrhea (Primary)  [K52.9] Colitis          ED Disposition Condition    Discharge Stable          ED Prescriptions       Medication Sig Dispense Start Date End Date Auth. Provider    ondansetron (ZOFRAN-ODT) 4 MG TbDL Take 1 tablet (4 mg total) by mouth every 6 (six) hours as needed (Vomiting). 20 tablet 3/24/2024 -- Andres Sunshine DO    levoFLOXacin (LEVAQUIN) 750 MG tablet Take 1 tablet (750 mg total) by mouth once daily. 7 tablet 3/24/2024 -- Andres Sunshine DO    ketorolac (TORADOL) 10 mg tablet Take 1 tablet (10 mg total) by mouth every 6 (six) hours. for 5 days 20 tablet 3/24/2024 3/29/2024 Andres Sunshine DO          Follow-up Information       Follow up With Specialties Details Why Contact Info    Gastroenterologist  In 1 day               Andres Sunshine DO  03/24/24 2762

## 2024-03-24 NOTE — DISCHARGE INSTRUCTIONS
Take the medications as prescribed and follow up with gastroenterologist if needed.  Return to emergency room if symptoms worsen.

## 2024-04-15 PROBLEM — Z00.00 WELL ADULT EXAM: Status: RESOLVED | Noted: 2024-01-11 | Resolved: 2024-04-15

## 2024-04-29 ENCOUNTER — PATIENT MESSAGE (OUTPATIENT)
Dept: ADMINISTRATIVE | Facility: HOSPITAL | Age: 64
End: 2024-04-29
Payer: MEDICAID

## 2024-05-02 ENCOUNTER — TELEPHONE (OUTPATIENT)
Dept: FAMILY MEDICINE | Facility: CLINIC | Age: 64
End: 2024-05-02
Payer: MEDICAID

## 2024-05-02 DIAGNOSIS — R06.09 DOE (DYSPNEA ON EXERTION): ICD-10-CM

## 2024-05-02 RX ORDER — ALBUTEROL SULFATE 90 UG/1
1-2 AEROSOL, METERED RESPIRATORY (INHALATION) EVERY 6 HOURS PRN
Qty: 18 G | Refills: 3 | Status: SHIPPED | OUTPATIENT
Start: 2024-05-02 | End: 2025-05-02

## 2024-06-19 ENCOUNTER — PATIENT MESSAGE (OUTPATIENT)
Facility: CLINIC | Age: 64
End: 2024-06-19
Payer: MEDICAID

## 2024-06-19 ENCOUNTER — OFFICE VISIT (OUTPATIENT)
Dept: FAMILY MEDICINE | Facility: CLINIC | Age: 64
End: 2024-06-19
Payer: MEDICAID

## 2024-06-19 VITALS
SYSTOLIC BLOOD PRESSURE: 116 MMHG | OXYGEN SATURATION: 98 % | HEIGHT: 65 IN | DIASTOLIC BLOOD PRESSURE: 72 MMHG | BODY MASS INDEX: 33.15 KG/M2 | TEMPERATURE: 98 F | HEART RATE: 64 BPM | WEIGHT: 199 LBS

## 2024-06-19 DIAGNOSIS — M79.10 MYALGIA: Primary | ICD-10-CM

## 2024-06-19 LAB
ANION GAP SERPL CALC-SCNC: 9 MEQ/L (ref 2–13)
BASOPHILS # BLD AUTO: 0.01 X10(3)/MCL (ref 0.01–0.08)
BASOPHILS NFR BLD AUTO: 0.1 % (ref 0.1–1.2)
BUN SERPL-MCNC: 16 MG/DL (ref 7–20)
CALCIUM SERPL-MCNC: 10.4 MG/DL (ref 8.4–10.2)
CHLORIDE SERPL-SCNC: 104 MMOL/L (ref 98–110)
CK SERPL-CCNC: 93 U/L (ref 30–135)
CO2 SERPL-SCNC: 27 MMOL/L (ref 21–32)
CREAT SERPL-MCNC: 0.94 MG/DL (ref 0.66–1.25)
CREAT/UREA NIT SERPL: 17 (ref 12–20)
EOSINOPHIL # BLD AUTO: 0.06 X10(3)/MCL (ref 0.04–0.36)
EOSINOPHIL NFR BLD AUTO: 0.8 % (ref 0.7–7)
ERYTHROCYTE [DISTWIDTH] IN BLOOD BY AUTOMATED COUNT: 13.2 % (ref 11–14.5)
GFR SERPLBLD CREATININE-BSD FMLA CKD-EPI: 69 ML/MIN/1.73/M2
GLUCOSE SERPL-MCNC: 99 MG/DL (ref 70–115)
HCT VFR BLD AUTO: 47.4 % (ref 36–48)
HGB BLD-MCNC: 16.1 G/DL (ref 11.8–16)
IMM GRANULOCYTES # BLD AUTO: 0.01 X10(3)/MCL (ref 0–0.03)
IMM GRANULOCYTES NFR BLD AUTO: 0.1 % (ref 0–0.5)
LYMPHOCYTES # BLD AUTO: 2.13 X10(3)/MCL (ref 1.16–3.74)
LYMPHOCYTES NFR BLD AUTO: 28.7 % (ref 20–55)
MCH RBC QN AUTO: 31.4 PG (ref 27–34)
MCHC RBC AUTO-ENTMCNC: 34 G/DL (ref 31–37)
MCV RBC AUTO: 92.6 FL (ref 79–99)
MONOCYTES # BLD AUTO: 0.74 X10(3)/MCL (ref 0.24–0.36)
MONOCYTES NFR BLD AUTO: 10 % (ref 4.7–12.5)
NEUTROPHILS # BLD AUTO: 4.48 X10(3)/MCL (ref 1.56–6.13)
NEUTROPHILS NFR BLD AUTO: 60.3 % (ref 37–73)
NRBC BLD AUTO-RTO: 0 %
PLATELET # BLD AUTO: 325 X10(3)/MCL (ref 140–371)
PMV BLD AUTO: 10.6 FL (ref 9.4–12.4)
POTASSIUM SERPL-SCNC: 4.1 MMOL/L (ref 3.5–5.1)
RBC # BLD AUTO: 5.12 X10(6)/MCL (ref 4–5.1)
SODIUM SERPL-SCNC: 140 MMOL/L (ref 136–145)
WBC # BLD AUTO: 7.43 X10(3)/MCL (ref 4–11.5)

## 2024-06-19 PROCEDURE — 80048 BASIC METABOLIC PNL TOTAL CA: CPT | Performed by: NURSE PRACTITIONER

## 2024-06-19 PROCEDURE — 99214 OFFICE O/P EST MOD 30 MIN: CPT | Mod: ,,, | Performed by: NURSE PRACTITIONER

## 2024-06-19 PROCEDURE — 1160F RVW MEDS BY RX/DR IN RCRD: CPT | Mod: CPTII,,, | Performed by: NURSE PRACTITIONER

## 2024-06-19 PROCEDURE — 3078F DIAST BP <80 MM HG: CPT | Mod: CPTII,,, | Performed by: NURSE PRACTITIONER

## 2024-06-19 PROCEDURE — 3008F BODY MASS INDEX DOCD: CPT | Mod: CPTII,,, | Performed by: NURSE PRACTITIONER

## 2024-06-19 PROCEDURE — 82550 ASSAY OF CK (CPK): CPT | Performed by: NURSE PRACTITIONER

## 2024-06-19 PROCEDURE — 3074F SYST BP LT 130 MM HG: CPT | Mod: CPTII,,, | Performed by: NURSE PRACTITIONER

## 2024-06-19 PROCEDURE — 1159F MED LIST DOCD IN RCRD: CPT | Mod: CPTII,,, | Performed by: NURSE PRACTITIONER

## 2024-06-19 PROCEDURE — 85025 COMPLETE CBC W/AUTO DIFF WBC: CPT | Performed by: NURSE PRACTITIONER

## 2024-06-19 RX ORDER — AMLODIPINE BESYLATE 10 MG/1
10 TABLET ORAL DAILY
COMMUNITY
Start: 2024-06-04

## 2024-06-19 NOTE — ASSESSMENT & PLAN NOTE
CBC, BMP, CPK today  Hold statin for one week and reassess  We discussed appointment with cardiology but she declines. If no improvement, consider vascular assessment by cardiology

## 2024-06-19 NOTE — PROGRESS NOTES
Patient ID: Martha Merino  : 1960    Chief Complaint: Leg Pain (X 1 week )    Allergies: Patient is allergic to esomeprazole, omeprazole magnesium, codeine, promethazine-phenyleph-codeine, nexium [esomeprazole magnesium], and prilosec [omeprazole].     History of Present Illness:  The patient is a 63 y.o. White female who presents to clinic for follow up on Leg Pain (X 1 week ). She complains of aching to bilateral legs.  Also describes some heaviness, fatigue, and weakness to her legs. Minimal lower back pain. Pain has been constant for 1.5 weeks. Pain is unchanged by activity. No improvement with voltaren gel.  No bowel or bladder incontinence.  No recent fall or injury.  Denies decreased sensation of the lower extremities.  She experienced mild swelling for 1-2 days but this has resolved.    Social History:  reports that she has never smoked. She has never been exposed to tobacco smoke. She has never used smokeless tobacco. She reports that she does not drink alcohol and does not use drugs.    Past Medical History:  has a past medical history of Arthritis, GERD (gastroesophageal reflux disease), Hiatal hernia, Hypertension, Obesity, and SOB (shortness of breath) on exertion.    Current Medications:  Current Outpatient Medications   Medication Instructions    albuterol (PROVENTIL HFA) 90 mcg/actuation inhaler 1-2 puffs, Inhalation, Every 6 hours PRN, Rescue    amLODIPine (NORVASC) 10 mg, Oral, Daily    aspirin (ECOTRIN) 81 mg, Oral, Daily    calcium-vitamin D3 (OS-ANGELICA 500 + D3) 500 mg-5 mcg (200 unit) per tablet 1 tablet, Oral, Daily    diclofenac sodium (VOLTAREN) 2 g, Topical (Top), 4 times daily PRN    levoFLOXacin (LEVAQUIN) 750 mg, Oral, Daily    metoprolol tartrate (LOPRESSOR) 12.5 mg, Oral, 2 times daily    nitroGLYCERIN (NITROSTAT) 0.4 mg, Sublingual, As needed (PRN)    ondansetron (ZOFRAN-ODT) 4 mg, Oral, Every 6 hours PRN    rosuvastatin (CRESTOR) 5 mg, Oral, Daily    TRINTELLIX 10 mg, Oral,  "Daily       ROS: See HPI    Visit Vitals  /72 (BP Location: Left arm)   Pulse 64   Temp 98.1 °F (36.7 °C) (Temporal)   Ht 5' 5" (1.651 m)   Wt 90.3 kg (199 lb)   SpO2 98%   BMI 33.12 kg/m²       Physical Exam  Vitals reviewed.   Constitutional:       Appearance: Normal appearance.   Cardiovascular:      Rate and Rhythm: Normal rate and regular rhythm.      Heart sounds: Normal heart sounds.      Comments: Dorsalis pedis pulses +2 and symmetric  Pulmonary:      Effort: Pulmonary effort is normal.      Breath sounds: Normal breath sounds.   Skin:     General: Skin is warm and dry.      Capillary Refill: Capillary refill takes less than 2 seconds.      Comments: Extremities warm with good color   Neurological:      Mental Status: She is alert and oriented to person, place, and time. Mental status is at baseline.          Labs Reviewed:  Chemistry:  Lab Results   Component Value Date     03/24/2024    K 3.9 03/24/2024    BUN 16.0 03/24/2024    CREATININE 0.94 03/24/2024    EGFRNORACEVR 69 03/24/2024    GLUCOSE 112 03/24/2024    CALCIUM 9.3 03/24/2024    ALKPHOS 144 03/24/2024    LABPROT 8.1 03/24/2024    ALBUMIN 4.7 03/24/2024    BILIDIR 0.3 12/16/2021    IBILI 0.40 12/16/2021    AST 48 (H) 03/24/2024    ALT 37 03/24/2024    [id number] 23.17 (L) 05/18/2018    TSH 2.650 07/03/2023    HXBHLS1HDWG 0.90 03/09/2021        Lab Results   Component Value Date    HGBA1C 5.4 07/03/2023        Hematology:  Lab Results   Component Value Date    WBC 8.24 03/24/2024    RBC 5.22 (H) 03/24/2024    HGB 16.4 (H) 03/24/2024    HCT 49.1 (H) 03/24/2024    MCV 94.1 03/24/2024    MCH 31.4 03/24/2024    MCHC 33.4 03/24/2024    RDW 13.1 03/24/2024     03/24/2024    MPV 10.7 03/24/2024       Lipid Panel:  Lab Results   Component Value Date    CHOL 120 03/06/2024    HDL 51 03/06/2024    TRIG 83 03/06/2024    TOTALCHOLEST 4 06/20/2022        Assessment & Plan:  1. Myalgia  Assessment & Plan:  CBC, BMP, CPK today  Hold statin " for one week and reassess  If no improvement, consider vascular assessment by cardiology     Orders:  -     CBC Auto Differential; Future; Expected date: 06/19/2024  -     Basic Metabolic Panel; Future; Expected date: 06/19/2024  -     CK; Future; Expected date: 06/19/2024         Future Appointments   Date Time Provider Department Center   7/17/2024  8:50 AM LAB, [facility] LABORATORY DRAW STATION [facility] [place] [facility]   8/14/2024 11:00 AM Daisy Cordova FNP [facility]   2/3/2025  8:50 AM Brunilda Landeros, ANP [facility] G[facility] [place]       Follow up in about 1 week (around 6/26/2024) for leg pain . Call sooner if needed.    CHELSEA Azul    Lab Frequency Next Occurrence   CBC Auto Differential Once 06/13/2024   Comprehensive Metabolic Panel Once 06/13/2024   Lipid Panel Once 06/13/2024   TSH Once 06/13/2024   Hemoglobin A1C Once 06/13/2024

## 2024-06-27 ENCOUNTER — HOSPITAL ENCOUNTER (OUTPATIENT)
Dept: RADIOLOGY | Facility: HOSPITAL | Age: 64
Discharge: HOME | End: 2024-06-27
Attending: NURSE PRACTITIONER
Payer: MEDICAID

## 2024-06-27 ENCOUNTER — OFFICE VISIT (OUTPATIENT)
Dept: FAMILY MEDICINE | Facility: CLINIC | Age: 64
End: 2024-06-27
Payer: MEDICAID

## 2024-06-27 VITALS
WEIGHT: 200 LBS | OXYGEN SATURATION: 98 % | HEIGHT: 65 IN | SYSTOLIC BLOOD PRESSURE: 118 MMHG | HEART RATE: 80 BPM | BODY MASS INDEX: 33.32 KG/M2 | DIASTOLIC BLOOD PRESSURE: 80 MMHG | TEMPERATURE: 98 F

## 2024-06-27 DIAGNOSIS — M54.16 LUMBAR RADICULOPATHY: Primary | ICD-10-CM

## 2024-06-27 DIAGNOSIS — E78.5 HYPERLIPIDEMIA, UNSPECIFIED HYPERLIPIDEMIA TYPE: ICD-10-CM

## 2024-06-27 DIAGNOSIS — M54.16 LUMBAR RADICULOPATHY: ICD-10-CM

## 2024-06-27 PROCEDURE — 72100 X-RAY EXAM L-S SPINE 2/3 VWS: CPT | Mod: TC

## 2024-06-27 RX ORDER — MELOXICAM 15 MG/1
15 TABLET ORAL DAILY
Qty: 14 TABLET | Refills: 0 | Status: SHIPPED | OUTPATIENT
Start: 2024-06-27 | End: 2024-07-11

## 2024-06-27 NOTE — PROGRESS NOTES
Patient ID: Martha Merino  : 1960    Chief Complaint: Myalgia follow up    Allergies: Patient is allergic to esomeprazole, omeprazole magnesium, codeine, promethazine-phenyleph-codeine, nexium [esomeprazole magnesium], and prilosec [omeprazole].     History of Present Illness:  The patient is a 63 y.o. White female who presents to clinic for follow up on Myalgia follow up. Last visit, rosuvastatin was stopped due to complaint of leg pain and weakness. Pain has slightly improved. She continues to experience an aching pain if she walks a long distance or stands for a prolonged duration. Pain is worse on the right than on the left. Denies any recent fall or known injury. No bowel or bladder incontinence.     Social History:  reports that she has never smoked. She has never been exposed to tobacco smoke. She has never used smokeless tobacco. She reports that she does not drink alcohol and does not use drugs.    Past Medical History:  has a past medical history of Arthritis, GERD (gastroesophageal reflux disease), Hiatal hernia, Hypertension, Obesity, and SOB (shortness of breath) on exertion.    Current Medications:  Current Outpatient Medications   Medication Instructions    albuterol (PROVENTIL HFA) 90 mcg/actuation inhaler 1-2 puffs, Inhalation, Every 6 hours PRN, Rescue    amLODIPine (NORVASC) 10 mg, Oral, Daily    aspirin (ECOTRIN) 81 mg, Oral, Daily    calcium-vitamin D3 (OS-ANGELICA 500 + D3) 500 mg-5 mcg (200 unit) per tablet 1 tablet, Oral, Daily    diclofenac sodium (VOLTAREN) 2 g, Topical (Top), 4 times daily PRN    levoFLOXacin (LEVAQUIN) 750 mg, Oral, Daily    meloxicam (MOBIC) 15 mg, Oral, Daily    metoprolol tartrate (LOPRESSOR) 12.5 mg, Oral, 2 times daily    nitroGLYCERIN (NITROSTAT) 0.4 mg, Sublingual, As needed (PRN)    ondansetron (ZOFRAN-ODT) 4 mg, Oral, Every 6 hours PRN    rosuvastatin (CRESTOR) 5 mg, Oral, Daily    TRINTELLIX 10 mg, Oral, Daily     ROS: See HPI    Visit Vitals  /80  "(BP Location: Right arm)   Pulse 80   Temp 98.1 °F (36.7 °C) (Temporal)   Ht 5' 5" (1.651 m)   Wt 90.7 kg (200 lb)   SpO2 98%   BMI 33.28 kg/m²       Physical Exam  Vitals reviewed.   Constitutional:       Appearance: Normal appearance.   Cardiovascular:      Rate and Rhythm: Normal rate and regular rhythm.      Heart sounds: Normal heart sounds.   Pulmonary:      Effort: Pulmonary effort is normal.      Breath sounds: Normal breath sounds.   Musculoskeletal:      Lumbar back: Tenderness and bony tenderness present. Positive right straight leg raise test.   Skin:     General: Skin is warm and dry.   Neurological:      Mental Status: She is alert and oriented to person, place, and time. Mental status is at baseline.          Labs Reviewed:  Chemistry:  Lab Results   Component Value Date     06/19/2024    K 4.1 06/19/2024    BUN 16 06/19/2024    CREATININE 0.94 06/19/2024    EGFRNORACEVR 69 06/19/2024    GLUCOSE 99 06/19/2024    CALCIUM 10.4 (H) 06/19/2024    ALKPHOS 144 03/24/2024    LABPROT 8.1 03/24/2024    ALBUMIN 4.7 03/24/2024    BILIDIR 0.3 12/16/2021    IBILI 0.40 12/16/2021    AST 48 (H) 03/24/2024    ALT 37 03/24/2024    [id number] 23.17 (L) 05/18/2018    TSH 2.650 07/03/2023    DSYXWD0YFUV 0.90 03/09/2021        Lab Results   Component Value Date    HGBA1C 5.4 07/03/2023        Hematology:  Lab Results   Component Value Date    WBC 7.43 06/19/2024    RBC 5.12 (H) 06/19/2024    HGB 16.1 (H) 06/19/2024    HCT 47.4 06/19/2024    MCV 92.6 06/19/2024    MCH 31.4 06/19/2024    MCHC 34.0 06/19/2024    RDW 13.2 06/19/2024     06/19/2024    MPV 10.6 06/19/2024       Lipid Panel:  Lab Results   Component Value Date    CHOL 120 03/06/2024    HDL 51 03/06/2024    TRIG 83 03/06/2024    TOTALCHOLEST 4 06/20/2022        Assessment & Plan:  1. Lumbar radiculopathy  Assessment & Plan:  X-ray concerning for spinal stenosis so obtain MRI of the lumbar spine without contrast   Begin meloxicam daily   Begin home " exercises    Orders:  -     X-Ray Lumbar Spine AP And Lateral; Future; Expected date: 06/27/2024  -     meloxicam (MOBIC) 15 MG tablet; Take 1 tablet (15 mg total) by mouth once daily. for 14 days  Dispense: 14 tablet; Refill: 0  -     MRI Lumbar Spine Without Contrast; Future; Expected date: 06/27/2024    2. Hyperlipidemia, unspecified hyperlipidemia type  Overview:  On rosuvastatin 5 mg daily    Assessment & Plan:  Restart rosuvastatin 5 mg  but at reduced frequency (every other night)           Future Appointments   Date Time Provider Department Center   7/17/2024  8:50 AM LAB, [facility] LABORATORY DRAW STATION [facility]   8/14/2024 11:00 AM Daisy Cordova FNP [facility]   2/3/2025  8:50 AM Brunilda Landeros, ANP [facility] [place]       Keep scheduled follow-up or Call sooner if needed.    CHELSEA Azul    Lab Frequency Next Occurrence   CBC Auto Differential Once 06/13/2024   Comprehensive Metabolic Panel Once 06/13/2024   Lipid Panel Once 06/13/2024   TSH Once 06/13/2024   Hemoglobin A1C Once 06/13/2024

## 2024-06-27 NOTE — ASSESSMENT & PLAN NOTE
X-ray concerning for spinal stenosis so obtain MRI of the lumbar spine without contrast   Begin meloxicam daily   Begin home exercises

## 2024-07-05 ENCOUNTER — HOSPITAL ENCOUNTER (OUTPATIENT)
Dept: RADIOLOGY | Facility: HOSPITAL | Age: 64
Discharge: HOME | End: 2024-07-05
Attending: NURSE PRACTITIONER
Payer: MEDICAID

## 2024-07-05 DIAGNOSIS — M54.16 LUMBAR RADICULOPATHY: ICD-10-CM

## 2024-07-05 PROCEDURE — 72148 MRI LUMBAR SPINE W/O DYE: CPT | Mod: TC

## 2024-07-17 ENCOUNTER — PATIENT MESSAGE (OUTPATIENT)
Facility: CLINIC | Age: 64
End: 2024-07-17
Payer: MEDICAID

## 2024-07-17 PROCEDURE — 85025 COMPLETE CBC W/AUTO DIFF WBC: CPT | Performed by: NURSE PRACTITIONER

## 2024-07-17 PROCEDURE — 84443 ASSAY THYROID STIM HORMONE: CPT | Performed by: NURSE PRACTITIONER

## 2024-07-17 PROCEDURE — 80061 LIPID PANEL: CPT | Performed by: NURSE PRACTITIONER

## 2024-07-17 PROCEDURE — 83036 HEMOGLOBIN GLYCOSYLATED A1C: CPT | Performed by: NURSE PRACTITIONER

## 2024-07-17 PROCEDURE — 80053 COMPREHEN METABOLIC PANEL: CPT | Performed by: NURSE PRACTITIONER

## 2024-08-13 ENCOUNTER — OFFICE VISIT (OUTPATIENT)
Dept: FAMILY MEDICINE | Facility: CLINIC | Age: 64
End: 2024-08-13
Payer: MEDICAID

## 2024-08-13 VITALS
BODY MASS INDEX: 33.15 KG/M2 | DIASTOLIC BLOOD PRESSURE: 78 MMHG | SYSTOLIC BLOOD PRESSURE: 112 MMHG | WEIGHT: 199 LBS | OXYGEN SATURATION: 99 % | HEART RATE: 70 BPM | TEMPERATURE: 98 F | HEIGHT: 65 IN

## 2024-08-13 DIAGNOSIS — E66.09 CLASS 1 OBESITY DUE TO EXCESS CALORIES WITH SERIOUS COMORBIDITY AND BODY MASS INDEX (BMI) OF 33.0 TO 33.9 IN ADULT: ICD-10-CM

## 2024-08-13 DIAGNOSIS — E78.5 HYPERLIPIDEMIA, UNSPECIFIED HYPERLIPIDEMIA TYPE: ICD-10-CM

## 2024-08-13 DIAGNOSIS — M54.50 CHRONIC MIDLINE LOW BACK PAIN WITHOUT SCIATICA: ICD-10-CM

## 2024-08-13 DIAGNOSIS — I10 PRIMARY HYPERTENSION: ICD-10-CM

## 2024-08-13 DIAGNOSIS — G89.29 CHRONIC MIDLINE LOW BACK PAIN WITHOUT SCIATICA: ICD-10-CM

## 2024-08-13 DIAGNOSIS — F41.1 GENERALIZED ANXIETY DISORDER: ICD-10-CM

## 2024-08-13 DIAGNOSIS — Z53.20 COLONOSCOPY REFUSED: ICD-10-CM

## 2024-08-13 DIAGNOSIS — M85.80 OSTEOPENIA, UNSPECIFIED LOCATION: ICD-10-CM

## 2024-08-13 DIAGNOSIS — Z00.00 WELLNESS EXAMINATION: Primary | ICD-10-CM

## 2024-08-13 DIAGNOSIS — F33.0 MAJOR DEPRESSIVE DISORDER, RECURRENT EPISODE, MILD: ICD-10-CM

## 2024-08-13 PROBLEM — R71.8 ELEVATED HEMATOCRIT: Status: RESOLVED | Noted: 2022-06-23 | Resolved: 2024-08-13

## 2024-08-13 PROBLEM — R74.8 HIGH ALKALINE PHOSPHATASE: Status: RESOLVED | Noted: 2022-06-22 | Resolved: 2024-08-13

## 2024-08-13 PROBLEM — M79.10 MYALGIA: Status: RESOLVED | Noted: 2024-06-19 | Resolved: 2024-08-13

## 2024-08-13 PROCEDURE — 1159F MED LIST DOCD IN RCRD: CPT | Mod: CPTII,,, | Performed by: NURSE PRACTITIONER

## 2024-08-13 PROCEDURE — 3008F BODY MASS INDEX DOCD: CPT | Mod: CPTII,,, | Performed by: NURSE PRACTITIONER

## 2024-08-13 PROCEDURE — 3078F DIAST BP <80 MM HG: CPT | Mod: CPTII,,, | Performed by: NURSE PRACTITIONER

## 2024-08-13 PROCEDURE — 3074F SYST BP LT 130 MM HG: CPT | Mod: CPTII,,, | Performed by: NURSE PRACTITIONER

## 2024-08-13 PROCEDURE — 1160F RVW MEDS BY RX/DR IN RCRD: CPT | Mod: CPTII,,, | Performed by: NURSE PRACTITIONER

## 2024-08-13 PROCEDURE — 99396 PREV VISIT EST AGE 40-64: CPT | Mod: ,,, | Performed by: NURSE PRACTITIONER

## 2024-08-13 PROCEDURE — 99213 OFFICE O/P EST LOW 20 MIN: CPT | Mod: 25,,, | Performed by: NURSE PRACTITIONER

## 2024-08-13 PROCEDURE — 3044F HG A1C LEVEL LT 7.0%: CPT | Mod: CPTII,,, | Performed by: NURSE PRACTITIONER

## 2024-08-13 RX ORDER — AMLODIPINE BESYLATE 10 MG/1
10 TABLET ORAL DAILY
Qty: 90 TABLET | Refills: 3 | Status: SHIPPED | OUTPATIENT
Start: 2024-08-13 | End: 2025-08-13

## 2024-08-13 RX ORDER — VORTIOXETINE 10 MG/1
10 TABLET, FILM COATED ORAL DAILY
Qty: 90 TABLET | Refills: 3 | Status: SHIPPED | OUTPATIENT
Start: 2024-08-13 | End: 2025-08-13

## 2024-08-13 RX ORDER — ROSUVASTATIN CALCIUM 5 MG/1
5 TABLET, COATED ORAL DAILY
Qty: 90 TABLET | Refills: 3 | Status: SHIPPED | OUTPATIENT
Start: 2024-08-13 | End: 2025-08-13

## 2024-08-13 RX ORDER — IBUPROFEN 600 MG/1
600 TABLET ORAL EVERY 8 HOURS PRN
Qty: 30 TABLET | Refills: 0 | Status: SHIPPED | OUTPATIENT
Start: 2024-08-13 | End: 2024-08-23

## 2024-08-13 RX ORDER — METOPROLOL TARTRATE 25 MG/1
12.5 TABLET, FILM COATED ORAL 2 TIMES DAILY
Qty: 90 TABLET | Refills: 3 | Status: SHIPPED | OUTPATIENT
Start: 2024-08-13 | End: 2025-08-13

## 2024-08-13 NOTE — ASSESSMENT & PLAN NOTE
Body mass index is 33.12 kg/m².  Goal BMI <30.  Exercise 5 times a week for 30 minutes per day.  Avoid soda, simple sugars, excessive rice, potatoes or bread. Limit fast foods and fried foods.  Choose complex carbs in moderation (example: green vegetables, beans, oatmeal). Eat plenty of fresh fruits and vegetables with lean meats daily.  Eat a balanced portion size.  Consider permanent healthy life style changes.

## 2024-08-13 NOTE — PROGRESS NOTES
Patient ID: Martha Merino  : 1960    Chief Complaint: Annual Exam (Wellness, lab prior)    Allergies: Patient is allergic to esomeprazole, omeprazole magnesium, codeine, promethazine-phenyleph-codeine, nexium [esomeprazole magnesium], and prilosec [omeprazole].     History of Present Illness:  The patient is a 64 y.o. White female who presents to clinic for follow up on Annual Exam (Wellness, lab prior).  Complains of lower back pain that has been intermittent for several months.  Pain abruptly worsened a few days ago after doing some yard work.  Pain is worsened by movement and when lying supine.  Denies any lower extremity weakness, radiation of the pain, or bowel or bladder incontinence.      Social History:  reports that she has never smoked. She has never been exposed to tobacco smoke. She has never used smokeless tobacco. She reports that she does not drink alcohol and does not use drugs.    Past Medical History:  has a past medical history of Arthritis, Elevated hematocrit, GERD (gastroesophageal reflux disease), Hiatal hernia, High alkaline phosphatase, Hypertension, Obesity, and SOB (shortness of breath) on exertion.    Current Medications:  Current Outpatient Medications   Medication Instructions    albuterol (PROVENTIL HFA) 90 mcg/actuation inhaler 1-2 puffs, Inhalation, Every 6 hours PRN, Rescue    amLODIPine (NORVASC) 10 mg, Oral, Daily    aspirin (ECOTRIN) 81 mg, Oral, Daily    calcium-vitamin D3 (OS-ANGELICA 500 + D3) 500 mg-5 mcg (200 unit) per tablet 1 tablet, Oral, Daily    diclofenac sodium (VOLTAREN) 2 g, Topical (Top), 4 times daily PRN    metoprolol tartrate (LOPRESSOR) 12.5 mg, Oral, 2 times daily    nitroGLYCERIN (NITROSTAT) 0.4 mg, Sublingual, As needed (PRN)    ondansetron (ZOFRAN-ODT) 4 mg, Oral, Every 6 hours PRN    rosuvastatin (CRESTOR) 5 mg, Oral, Daily    TRINTELLIX 10 mg, Oral, Daily       Review of Systems   Constitutional:  Negative for chills, fever, malaise/fatigue and  "weight loss.   HENT:  Negative for congestion, ear pain and sore throat.    Eyes:  Negative for blurred vision, double vision, pain and redness.   Respiratory:  Negative for cough, hemoptysis, sputum production and wheezing.    Cardiovascular:  Negative for chest pain, palpitations and leg swelling.   Gastrointestinal:  Negative for abdominal pain, nausea and vomiting.   Genitourinary:  Negative for dysuria, hematuria and urgency.   Musculoskeletal:  Positive for back pain. Negative for falls, myalgias and neck pain.   Skin:  Negative for itching and rash.   Neurological:  Negative for dizziness, seizures and headaches.   Endo/Heme/Allergies:  Negative for environmental allergies and polydipsia. Does not bruise/bleed easily.   Psychiatric/Behavioral:  Negative for depression, memory loss and suicidal ideas. The patient is not nervous/anxious.         Visit Vitals  /78 (BP Location: Right arm, Patient Position: Sitting, BP Method: Medium (Manual))   Pulse 70   Temp 98.1 °F (36.7 °C) (Temporal)   Ht 5' 5" (1.651 m)   Wt 90.3 kg (199 lb)   SpO2 99%   BMI 33.12 kg/m²       Physical Exam  Vitals reviewed.   Constitutional:       Appearance: Normal appearance. She is obese.   HENT:      Right Ear: Tympanic membrane, ear canal and external ear normal.      Left Ear: Tympanic membrane, ear canal and external ear normal.      Mouth/Throat:      Mouth: Mucous membranes are moist.   Eyes:      General: No scleral icterus.     Extraocular Movements: Extraocular movements intact.      Conjunctiva/sclera: Conjunctivae normal.      Pupils: Pupils are equal, round, and reactive to light.   Cardiovascular:      Rate and Rhythm: Normal rate and regular rhythm.      Pulses: Normal pulses.      Heart sounds: Normal heart sounds.   Pulmonary:      Effort: Pulmonary effort is normal.      Breath sounds: Normal breath sounds.   Abdominal:      General: Bowel sounds are normal.      Palpations: Abdomen is soft.      Tenderness: There " is no abdominal tenderness.   Musculoskeletal:      Cervical back: Normal range of motion and neck supple. No tenderness.      Comments: Tenderness surrounding lumbar spine.  Full active range of motion with pain   Lymphadenopathy:      Cervical: No cervical adenopathy.   Skin:     General: Skin is warm and dry.   Neurological:      Mental Status: She is alert and oriented to person, place, and time. Mental status is at baseline.   Psychiatric:         Mood and Affect: Mood normal.         Thought Content: Thought content normal.         Judgment: Judgment normal.          Labs Reviewed:  Chemistry:  Lab Results   Component Value Date     07/17/2024    K 4.6 07/17/2024    BUN 18 07/17/2024    CREATININE 0.92 07/17/2024    EGFRNORACEVR 71 07/17/2024    GLUCOSE 89 07/17/2024    CALCIUM 9.9 07/17/2024    ALKPHOS 113 07/17/2024    LABPROT 6.8 07/17/2024    ALBUMIN 4.2 07/17/2024    BILIDIR 0.3 12/16/2021    IBILI 0.40 12/16/2021    AST 23 07/17/2024    ALT 17 07/17/2024    [id number] 23.17 (L) 05/18/2018    TSH 2.340 07/17/2024    FYBWJI8OIPJ 0.90 03/09/2021        Lab Results   Component Value Date    HGBA1C 5.4 07/17/2024        Hematology:  Lab Results   Component Value Date    WBC 6.06 07/17/2024    RBC 4.73 07/17/2024    HGB 14.8 07/17/2024    HCT 43.6 07/17/2024    MCV 92.2 07/17/2024    MCH 31.3 07/17/2024    MCHC 33.9 07/17/2024    RDW 13.4 07/17/2024     07/17/2024    MPV 10.9 07/17/2024       Lipid Panel:  Lab Results   Component Value Date    CHOL 124 07/17/2024    HDL 46 07/17/2024    TRIG 78 07/17/2024    TOTALCHOLEST 4 06/20/2022        Assessment & Plan:  1. Wellness examination  Overview:  Health Maintenance:   Colon Ca Screening-FIT negative 7/2021; declines further screening   Breast Ca Screening-Mammogram 12/2023 abnormal with f/u US 1/2024. Repeat screening annually    Cervical Ca screening- per GYN; last PAP 1/2023      2. Major depressive disorder, recurrent episode,  mild  Overview:  On Trintellix 10 mg daily    Assessment & Plan:  No complaints today   Continue current medication    Orders:  -     vortioxetine (TRINTELLIX) 10 mg Tab; Take 1 tablet (10 mg total) by mouth once daily.  Dispense: 90 tablet; Refill: 3    3. Generalized anxiety disorder  Overview:  On Trintellix 10 mg daily    Assessment & Plan:  Well-controlled   Continue current medication    Orders:  -     vortioxetine (TRINTELLIX) 10 mg Tab; Take 1 tablet (10 mg total) by mouth once daily.  Dispense: 90 tablet; Refill: 3    4. Primary hypertension  Overview:  Amlodipine 5 mg and metoprolol 12.5 mg b.i.d.    Assessment & Plan:  Well-controlled today   Continue current medication    Orders:  -     amLODIPine (NORVASC) 10 MG tablet; Take 1 tablet (10 mg total) by mouth once daily.  Dispense: 90 tablet; Refill: 3  -     metoprolol tartrate (LOPRESSOR) 25 MG tablet; Take 0.5 tablets (12.5 mg total) by mouth 2 (two) times daily.  Dispense: 90 tablet; Refill: 3    5. Hyperlipidemia, unspecified hyperlipidemia type  Overview:  On rosuvastatin 5 mg daily    Assessment & Plan:  Lipid Panel:  Lab Results   Component Value Date    CHOL 124 07/17/2024    HDL 46 07/17/2024    LDLDIRECT 67.6 07/17/2024    TRIG 78 07/17/2024    TOTALCHOLEST 4 06/20/2022     LDL at goal; continue rosuvastatin    Orders:  -     rosuvastatin (CRESTOR) 5 MG tablet; Take 1 tablet (5 mg total) by mouth once daily.  Dispense: 90 tablet; Refill: 3    6. Class 1 obesity due to excess calories with serious comorbidity and body mass index (BMI) of 33.0 to 33.9 in adult  Assessment & Plan:  Body mass index is 33.12 kg/m².  Goal BMI <30.  Exercise 5 times a week for 30 minutes per day.  Avoid soda, simple sugars, excessive rice, potatoes or bread. Limit fast foods and fried foods.  Choose complex carbs in moderation (example: green vegetables, beans, oatmeal). Eat plenty of fresh fruits and vegetables with lean meats daily.  Eat a balanced portion  size.  Consider permanent healthy life style changes.        7. Osteopenia, unspecified location  Overview:  On calcium with vitamin-D  CT bone density 3/2024: Bone mineral density measurements indicative of osteopenia as described above.       8. Colonoscopy refused  Assessment & Plan:  Cologuard ordered previously but patient declines all screening for colon cancer      9. Chronic midline low back pain without sciatica  Assessment & Plan:  Begin ibuprofen as needed   Order given for PT           Future Appointments   Date Time Provider Department Center   11/7/2024  9:30 AM LAB, [facility] LABORATORY DRAW STATION [facility] [facility]   11/13/2024  8:00 AM Daisy Cordova FNP [facility] [place] [place] [facility]   2/3/2025  8:50 AM Brunilda Landeros, ANP [facility] GYN [place]       Follow up in about 3 months (around 11/13/2024) for HTN, With Fasting Labs Prior. Call sooner if needed.    CHELSEA Azul

## 2024-09-25 ENCOUNTER — TELEPHONE (OUTPATIENT)
Dept: FAMILY MEDICINE | Facility: CLINIC | Age: 64
End: 2024-09-25
Payer: MEDICAID

## 2024-09-25 DIAGNOSIS — Z12.11 SCREENING FOR COLON CANCER: Primary | ICD-10-CM

## 2024-10-29 ENCOUNTER — PATIENT MESSAGE (OUTPATIENT)
Facility: CLINIC | Age: 64
End: 2024-10-29
Payer: MEDICAID

## 2024-11-08 PROCEDURE — 80053 COMPREHEN METABOLIC PANEL: CPT | Performed by: NURSE PRACTITIONER

## 2024-11-13 ENCOUNTER — OFFICE VISIT (OUTPATIENT)
Dept: FAMILY MEDICINE | Facility: CLINIC | Age: 64
End: 2024-11-13
Payer: MEDICAID

## 2024-11-13 VITALS
DIASTOLIC BLOOD PRESSURE: 72 MMHG | OXYGEN SATURATION: 99 % | TEMPERATURE: 97 F | HEIGHT: 65 IN | SYSTOLIC BLOOD PRESSURE: 122 MMHG | WEIGHT: 202 LBS | BODY MASS INDEX: 33.66 KG/M2 | HEART RATE: 86 BPM

## 2024-11-13 DIAGNOSIS — Z12.11 SCREENING FOR COLON CANCER: Primary | ICD-10-CM

## 2024-11-13 DIAGNOSIS — I10 PRIMARY HYPERTENSION: ICD-10-CM

## 2024-11-13 DIAGNOSIS — E78.5 HYPERLIPIDEMIA, UNSPECIFIED HYPERLIPIDEMIA TYPE: ICD-10-CM

## 2024-11-13 DIAGNOSIS — Z12.11 SCREEN FOR COLON CANCER: ICD-10-CM

## 2024-11-13 DIAGNOSIS — E83.52 HYPERCALCEMIA: Primary | ICD-10-CM

## 2024-11-13 PROBLEM — R42 DIZZINESS: Status: RESOLVED | Noted: 2023-04-03 | Resolved: 2024-11-13

## 2024-11-13 LAB
BASOPHILS # BLD AUTO: 0.01 X10(3)/MCL (ref 0.01–0.08)
BASOPHILS NFR BLD AUTO: 0.1 % (ref 0.1–1.2)
CALCIUM SERPL-MCNC: 10.4 MG/DL (ref 8.4–10.2)
EOSINOPHIL # BLD AUTO: 0.11 X10(3)/MCL (ref 0.04–0.36)
EOSINOPHIL NFR BLD AUTO: 1.6 % (ref 0.7–7)
ERYTHROCYTE [DISTWIDTH] IN BLOOD BY AUTOMATED COUNT: 13.2 % (ref 11–14.5)
HCT VFR BLD AUTO: 47.7 % (ref 36–48)
HGB BLD-MCNC: 15.8 G/DL (ref 11.8–16)
IMM GRANULOCYTES # BLD AUTO: 0.03 X10(3)/MCL (ref 0–0.03)
IMM GRANULOCYTES NFR BLD AUTO: 0.4 % (ref 0–0.5)
LYMPHOCYTES # BLD AUTO: 2.17 X10(3)/MCL (ref 1.16–3.74)
LYMPHOCYTES NFR BLD AUTO: 31.3 % (ref 20–55)
MCH RBC QN AUTO: 31.3 PG (ref 27–34)
MCHC RBC AUTO-ENTMCNC: 33.1 G/DL (ref 31–37)
MCV RBC AUTO: 94.5 FL (ref 79–99)
MONOCYTES # BLD AUTO: 0.7 X10(3)/MCL (ref 0.24–0.36)
MONOCYTES NFR BLD AUTO: 10.1 % (ref 4.7–12.5)
NEUTROPHILS # BLD AUTO: 3.92 X10(3)/MCL (ref 1.56–6.13)
NEUTROPHILS NFR BLD AUTO: 56.5 % (ref 37–73)
NRBC BLD AUTO-RTO: 0 %
PLATELET # BLD AUTO: 346 X10(3)/MCL (ref 140–371)
PMV BLD AUTO: 11.2 FL (ref 9.4–12.4)
PTH-INTACT SERPL-MCNC: 29.8 PG/ML (ref 14–73)
RBC # BLD AUTO: 5.05 X10(6)/MCL (ref 4–5.1)
WBC # BLD AUTO: 6.94 X10(3)/MCL (ref 4–11.5)

## 2024-11-13 PROCEDURE — 3044F HG A1C LEVEL LT 7.0%: CPT | Mod: CPTII,,, | Performed by: NURSE PRACTITIONER

## 2024-11-13 PROCEDURE — 3074F SYST BP LT 130 MM HG: CPT | Mod: CPTII,,, | Performed by: NURSE PRACTITIONER

## 2024-11-13 PROCEDURE — 99214 OFFICE O/P EST MOD 30 MIN: CPT | Mod: ,,, | Performed by: NURSE PRACTITIONER

## 2024-11-13 PROCEDURE — 3078F DIAST BP <80 MM HG: CPT | Mod: CPTII,,, | Performed by: NURSE PRACTITIONER

## 2024-11-13 PROCEDURE — 85025 COMPLETE CBC W/AUTO DIFF WBC: CPT | Performed by: NURSE PRACTITIONER

## 2024-11-13 PROCEDURE — 3008F BODY MASS INDEX DOCD: CPT | Mod: CPTII,,, | Performed by: NURSE PRACTITIONER

## 2024-11-13 PROCEDURE — 83970 ASSAY OF PARATHORMONE: CPT | Performed by: NURSE PRACTITIONER

## 2024-11-13 PROCEDURE — 1159F MED LIST DOCD IN RCRD: CPT | Mod: CPTII,,, | Performed by: NURSE PRACTITIONER

## 2024-11-13 PROCEDURE — 1160F RVW MEDS BY RX/DR IN RCRD: CPT | Mod: CPTII,,, | Performed by: NURSE PRACTITIONER

## 2024-11-13 NOTE — PROGRESS NOTES
"Patient ID: Martha Merino  : 1960    Chief Complaint: Hypertension    Allergies: Patient is allergic to esomeprazole, omeprazole magnesium, codeine, promethazine-phenyleph-codeine, nexium [esomeprazole magnesium], and prilosec [omeprazole].     History of Present Illness:  The patient is a 64 y.o. White female who presents to clinic for evaluation of Hypertension.  She remains active without chest pain or shortness of breath.  She has a follow-up appointment next month at [facility].    Calcium elevated on recent labs.  She noticed the elevation so decreased her daily calcium supplementation that she was taking for osteopenia.    Social History:  reports that she has never smoked. She has never been exposed to tobacco smoke. She has never used smokeless tobacco. She reports that she does not drink alcohol and does not use drugs.    Past Medical History:  has a past medical history of Arthritis, Elevated hematocrit, GERD (gastroesophageal reflux disease), Hiatal hernia, High alkaline phosphatase, Hypertension, Obesity, and SOB (shortness of breath) on exertion.    Current Medications:  Current Outpatient Medications   Medication Instructions    albuterol (PROVENTIL HFA) 90 mcg/actuation inhaler 1-2 puffs, Inhalation, Every 6 hours PRN, Rescue    amLODIPine (NORVASC) 10 mg, Oral, Daily    aspirin (ECOTRIN) 81 mg, Daily    calcium-vitamin D3 (OS-ANGELICA 500 + D3) 500 mg-5 mcg (200 unit) per tablet 1 tablet, Daily    diclofenac sodium (VOLTAREN) 2 g, Topical (Top), 4 times daily PRN    metoprolol tartrate (LOPRESSOR) 12.5 mg, Oral, 2 times daily    nitroGLYCERIN (NITROSTAT) 0.4 mg, As needed (PRN)    ondansetron (ZOFRAN-ODT) 4 mg, Oral, Every 6 hours PRN    rosuvastatin (CRESTOR) 5 mg, Oral, Daily    TRINTELLIX 10 mg, Oral, Daily       ROS: See HPI    Visit Vitals  /72 (BP Location: Right arm)   Pulse 86   Temp 97 °F (36.1 °C) (Temporal)   Ht 5' 5" (1.651 m)   Wt 91.6 kg (202 lb)   SpO2 99%   BMI 33.61 kg/m² "       Physical Exam  Vitals reviewed.   Constitutional:       Appearance: Normal appearance.   Cardiovascular:      Rate and Rhythm: Normal rate and regular rhythm.   Pulmonary:      Effort: Pulmonary effort is normal.      Breath sounds: Normal breath sounds.   Musculoskeletal:      Cervical back: Normal range of motion and neck supple. No tenderness.   Lymphadenopathy:      Cervical: No cervical adenopathy.   Skin:     General: Skin is warm and dry.   Neurological:      Mental Status: She is alert and oriented to person, place, and time. Mental status is at baseline.          Labs Reviewed:  Chemistry:  Lab Results   Component Value Date     11/08/2024    K 4.5 11/08/2024    BUN 12 11/08/2024    CREATININE 0.94 11/08/2024    EGFRNORACEVR 68 11/08/2024    GLUCOSE 89 11/08/2024    CALCIUM 10.6 (H) 11/08/2024    ALKPHOS 154 (H) 11/08/2024    LABPROT 7.0 11/08/2024    ALBUMIN 4.2 11/08/2024    BILIDIR 0.3 12/16/2021    IBILI 0.40 12/16/2021    AST 26 11/08/2024    ALT 24 11/08/2024    [id number] 23.17 (L) 05/18/2018    TSH 2.340 07/17/2024    WRHMKO1CCEK 0.90 03/09/2021        Lipid Panel:  Lab Results   Component Value Date    CHOL 124 07/17/2024    HDL 46 07/17/2024    LDLDIRECT 67.6 07/17/2024    TRIG 78 07/17/2024    TOTALCHOLEST 4 06/20/2022        Assessment & Plan:  1. Hypercalcemia  Assessment & Plan:  Corrected calcium 10.4   Repeat calcium and PTH today; call with results    Orders:  -     PTH, Intact and Calcium; Future; Expected date: 11/13/2024    2. Primary hypertension  Overview:  Amlodipine 5 mg and metoprolol 12.5 mg b.i.d.    Assessment & Plan:  Well-controlled today   Continue current medication    Orders:  -     CBC Auto Differential; Future; Expected date: 11/13/2024  -     CBC Auto Differential; Future; Expected date: 02/13/2025    3. Screen for colon cancer  -     Cologuard Screening (Multitarget Stool DNA); Future; Expected date: 11/13/2024    4. Hyperlipidemia, unspecified  hyperlipidemia type  Overview:  On rosuvastatin 5 mg daily    Orders:  -     Comprehensive Metabolic Panel; Future; Expected date: 02/13/2025  -     Lipid Panel; Future; Expected date: 02/13/2025         Future Appointments   Date Time Provider Department Center   2/3/2025  8:50 AM Brunilda Landeros ANP [facility] GYN [place]   2/4/2025  9:30 AM LAB, [facility] LABORATORY DRAW STATION [facility] KENNY [place]   2/13/2025  8:00 [facility]       Follow up in about 3 months (around 2/13/2025) for HTN, With Fasting Labs Prior. Call sooner if needed.    CHELSEA Azul    Lab Frequency Next Occurrence   CBC Auto Differential Once 11/13/2024   PTH, Intact and Calcium Once 11/13/2024

## 2025-01-30 ENCOUNTER — TELEPHONE (OUTPATIENT)
Dept: FAMILY MEDICINE | Facility: CLINIC | Age: 65
End: 2025-01-30
Payer: MEDICAID

## 2025-01-30 DIAGNOSIS — M19.90 OSTEOARTHRITIS, UNSPECIFIED OSTEOARTHRITIS TYPE, UNSPECIFIED SITE: ICD-10-CM

## 2025-01-30 RX ORDER — DICLOFENAC SODIUM 10 MG/G
2 GEL TOPICAL 4 TIMES DAILY PRN
Qty: 100 G | Refills: 3 | Status: SHIPPED | OUTPATIENT
Start: 2025-01-30

## 2025-02-04 PROCEDURE — 80053 COMPREHEN METABOLIC PANEL: CPT | Performed by: NURSE PRACTITIONER

## 2025-02-04 PROCEDURE — 80061 LIPID PANEL: CPT | Performed by: NURSE PRACTITIONER

## 2025-02-04 PROCEDURE — 85025 COMPLETE CBC W/AUTO DIFF WBC: CPT | Performed by: NURSE PRACTITIONER

## 2025-02-12 ENCOUNTER — OFFICE VISIT (OUTPATIENT)
Dept: FAMILY MEDICINE | Facility: CLINIC | Age: 65
End: 2025-02-12
Payer: MEDICAID

## 2025-02-12 ENCOUNTER — TELEPHONE (OUTPATIENT)
Dept: FAMILY MEDICINE | Facility: CLINIC | Age: 65
End: 2025-02-12

## 2025-02-12 VITALS
SYSTOLIC BLOOD PRESSURE: 122 MMHG | DIASTOLIC BLOOD PRESSURE: 82 MMHG | BODY MASS INDEX: 33.99 KG/M2 | HEART RATE: 67 BPM | OXYGEN SATURATION: 98 % | WEIGHT: 204 LBS | TEMPERATURE: 98 F | HEIGHT: 65 IN

## 2025-02-12 DIAGNOSIS — F33.0 MAJOR DEPRESSIVE DISORDER, RECURRENT EPISODE, MILD: ICD-10-CM

## 2025-02-12 DIAGNOSIS — E78.5 HYPERLIPIDEMIA, UNSPECIFIED HYPERLIPIDEMIA TYPE: ICD-10-CM

## 2025-02-12 DIAGNOSIS — Z12.31 SCREENING MAMMOGRAM FOR BREAST CANCER: ICD-10-CM

## 2025-02-12 DIAGNOSIS — I10 PRIMARY HYPERTENSION: Primary | ICD-10-CM

## 2025-02-12 DIAGNOSIS — M19.90 OSTEOARTHRITIS, UNSPECIFIED OSTEOARTHRITIS TYPE, UNSPECIFIED SITE: ICD-10-CM

## 2025-02-12 DIAGNOSIS — E55.9 VITAMIN D DEFICIENCY: ICD-10-CM

## 2025-02-12 DIAGNOSIS — F41.1 GENERALIZED ANXIETY DISORDER: ICD-10-CM

## 2025-02-12 PROCEDURE — 1160F RVW MEDS BY RX/DR IN RCRD: CPT | Mod: CPTII,,, | Performed by: NURSE PRACTITIONER

## 2025-02-12 PROCEDURE — 3079F DIAST BP 80-89 MM HG: CPT | Mod: CPTII,,, | Performed by: NURSE PRACTITIONER

## 2025-02-12 PROCEDURE — 3008F BODY MASS INDEX DOCD: CPT | Mod: CPTII,,, | Performed by: NURSE PRACTITIONER

## 2025-02-12 PROCEDURE — 3074F SYST BP LT 130 MM HG: CPT | Mod: CPTII,,, | Performed by: NURSE PRACTITIONER

## 2025-02-12 PROCEDURE — 82306 VITAMIN D 25 HYDROXY: CPT | Performed by: NURSE PRACTITIONER

## 2025-02-12 PROCEDURE — 99214 OFFICE O/P EST MOD 30 MIN: CPT | Mod: ,,, | Performed by: NURSE PRACTITIONER

## 2025-02-12 PROCEDURE — 1159F MED LIST DOCD IN RCRD: CPT | Mod: CPTII,,, | Performed by: NURSE PRACTITIONER

## 2025-02-12 RX ORDER — AMLODIPINE BESYLATE 10 MG/1
10 TABLET ORAL DAILY
Qty: 90 TABLET | Refills: 3 | Status: SHIPPED | OUTPATIENT
Start: 2025-02-12 | End: 2026-02-12

## 2025-02-12 RX ORDER — ROSUVASTATIN CALCIUM 5 MG/1
5 TABLET, COATED ORAL DAILY
Qty: 90 TABLET | Refills: 3 | Status: SHIPPED | OUTPATIENT
Start: 2025-02-12 | End: 2026-02-12

## 2025-02-12 RX ORDER — VORTIOXETINE 10 MG/1
10 TABLET, FILM COATED ORAL DAILY
Qty: 90 TABLET | Refills: 3 | Status: SHIPPED | OUTPATIENT
Start: 2025-02-12 | End: 2026-02-12

## 2025-02-12 RX ORDER — DICLOFENAC SODIUM 10 MG/G
2 GEL TOPICAL 4 TIMES DAILY PRN
Qty: 100 G | Refills: 3 | Status: SHIPPED | OUTPATIENT
Start: 2025-02-12

## 2025-02-12 NOTE — ASSESSMENT & PLAN NOTE
Practice deep breathing when anxiety occurs.  Exercise daily. Get sunlight daily.  Avoid caffeine, alcohol and stimulants.  Practice positive phrases and repeat throughout the day, along with yoga and relaxation techniques.  Set healthy boundaries, avoid people and conversations that increase stress.  Report any suicidal or homicidal ideations immediately. If clinic is closed, go to nearest emergency room.

## 2025-02-12 NOTE — ASSESSMENT & PLAN NOTE
Lab Results   Component Value Date    CHOL 142 02/04/2025    HDL 54 02/04/2025    LDLDIRECT 68.1 02/04/2025    TRIG 73 02/04/2025    TOTALCHOLEST 4 06/20/2022     Continue current medication  LDL Goal: less than 100  Educated on dietary modifications. Follow a low cholesterol, low saturated fat.  Avoid fried foods and high saturated fats.  Increase dietary fiber.  Regular exercise can reduce LDL (bad cholesterol) and raise HDL (good cholesterol). Encourage physical activity 5 times per week for 30 minutes per day.

## 2025-02-12 NOTE — TELEPHONE ENCOUNTER
Let her know that I put in a vitamin-D level to be drawn by lab.  I see that she has had low vitamin-D in the past.  It does not need to be fasting so she can do this at any time.

## 2025-02-12 NOTE — ASSESSMENT & PLAN NOTE
Remains Problematic.  Discussed increasing Trintellix but 20 mg has caused dizziness in the past.  We also discussed addition of Wellbutrin but patient declines

## 2025-02-12 NOTE — LETTER
February 12, 2025    Martha Merino  [address]  [place] [address]             [facility]  [address], [place]  [place] [address]  Phone: [phone]  Fax: [phone] To Whom It May Concern:     Please excuse Ms. Merino from jury duty. She has medical conditions including depression and anxiety that are exacerbated by crowds. If you have any questions or concerns, please don't hesitate to call the office.    Sincerely,        Daisy Cordova FNP

## 2025-02-12 NOTE — PROGRESS NOTES
Patient ID: Martha Merino  : 1960    Chief Complaint: Hypertension and lab follow up    Allergies: Patient is allergic to esomeprazole, omeprazole magnesium, codeine, promethazine-phenyleph-codeine, nexium [esomeprazole magnesium], and prilosec [omeprazole].     History of Present Illness:  The patient is a 64 y.o. White female who presents to clinic for evaluation of Hypertension and lab follow up   History of Present Illness    CHIEF COMPLAINT:  Patient presents today for follow up    DEPRESSION AND ANXIETY:  She reports worsening depression with intermittent feelings of being down. A previous attempt to increase Trintellix resulted in dizziness. She denies feeling excessively stressed or on edge. Her sleep is reported as adequate.    CARDIOVASCULAR:  Recent cardiology visit revealed no chest pain. She reports difficulty with ambulation. She continues metoprolol with a recent dose reduction to half of the previous dose.          Social History:  reports that she has never smoked. She has never been exposed to tobacco smoke. She has never used smokeless tobacco. She reports that she does not drink alcohol and does not use drugs.    Past Medical History:  has a past medical history of Arthritis, Elevated hematocrit, GERD (gastroesophageal reflux disease), Hiatal hernia, High alkaline phosphatase, Hypertension, Obesity, and SOB (shortness of breath) on exertion.    Current Medications:  Current Outpatient Medications   Medication Instructions    albuterol (PROVENTIL HFA) 90 mcg/actuation inhaler 1-2 puffs, Inhalation, Every 6 hours PRN, Rescue    amLODIPine (NORVASC) 10 mg, Oral, Daily    aspirin (ECOTRIN) 81 mg, Daily    calcium-vitamin D3 (OS-ANGELICA 500 + D3) 500 mg-5 mcg (200 unit) per tablet 1 tablet, Daily    diclofenac sodium (VOLTAREN) 2 g, Topical (Top), 4 times daily PRN    metoprolol tartrate (LOPRESSOR) 12.5 mg, Oral, 2 times daily    nitroGLYCERIN (NITROSTAT) 0.4 mg, As needed (PRN)    ondansetron  "(ZOFRAN-ODT) 4 mg, Oral, Every 6 hours PRN    rosuvastatin (CRESTOR) 5 mg, Oral, Daily    TRINTELLIX 10 mg, Oral, Daily       ROS: See HPI    Visit Vitals  /82 (BP Location: Right arm)   Pulse 67   Temp 97.5 °F (36.4 °C) (Temporal)   Ht 5' 5" (1.651 m)   Wt 92.5 kg (204 lb)   SpO2 98%   BMI 33.95 kg/m²       Physical Exam  Vitals reviewed.   Constitutional:       Appearance: Normal appearance.   Cardiovascular:      Rate and Rhythm: Normal rate and regular rhythm.      Heart sounds: Normal heart sounds.   Pulmonary:      Effort: Pulmonary effort is normal.      Breath sounds: Normal breath sounds.   Abdominal:      General: Bowel sounds are normal.      Palpations: Abdomen is soft.      Tenderness: There is no abdominal tenderness.   Skin:     General: Skin is warm and dry.   Neurological:      Mental Status: She is alert and oriented to person, place, and time. Mental status is at baseline.          Labs Reviewed:  Chemistry:  Lab Results   Component Value Date     02/04/2025    K 4.8 02/04/2025    BUN 17 02/04/2025    CREATININE 0.94 02/04/2025    EGFRNORACEVR 68 02/04/2025    GLUCOSE 87 02/04/2025    CALCIUM 9.4 02/04/2025    ALKPHOS 146 (H) 02/04/2025    LABPROT 6.9 02/04/2025    ALBUMIN 4.3 02/04/2025    BILIDIR 0.3 12/16/2021    IBILI 0.40 12/16/2021    AST 25 02/04/2025    ALT 18 02/04/2025    [id number] 23.17 (L) 05/18/2018    TSH 2.340 07/17/2024    [id number] 0.90 03/09/2021        Lab Results   Component Value Date    HGBA1C 5.4 07/17/2024        Hematology:  Lab Results   Component Value Date    WBC 6.65 02/04/2025    RBC 5.10 02/04/2025    HGB 15.5 02/04/2025    HCT 47.1 02/04/2025    MCV 92.4 02/04/2025    MCH 30.4 02/04/2025    MCHC 32.9 02/04/2025    RDW 13.4 02/04/2025     02/04/2025    MPV 10.8 02/04/2025       Lipid Panel:  Lab Results   Component Value Date    CHOL 142 02/04/2025    HDL 54 02/04/2025    LDLDIRECT 68.1 02/04/2025    TRIG 73 02/04/2025    TOTALCHOLEST 4 " 06/20/2022        Assessment & Plan:         1. Primary hypertension  Overview:  Amlodipine 5 mg and metoprolol 12.5 mg b.i.d.    Assessment & Plan:  Well-controlled today   Continue current medication    Orders:  -     amLODIPine (NORVASC) 10 MG tablet; Take 1 tablet (10 mg total) by mouth once daily.  Dispense: 90 tablet; Refill: 3    2. Hyperlipidemia, unspecified hyperlipidemia type  Overview:  On rosuvastatin 5 mg daily    Assessment & Plan:  Lab Results   Component Value Date    CHOL 142 02/04/2025    HDL 54 02/04/2025    LDLDIRECT 68.1 02/04/2025    TRIG 73 02/04/2025    TOTALCHOLEST 4 06/20/2022     Continue current medication  LDL Goal: less than 100  Educated on dietary modifications. Follow a low cholesterol, low saturated fat.  Avoid fried foods and high saturated fats.  Increase dietary fiber.  Regular exercise can reduce LDL (bad cholesterol) and raise HDL (good cholesterol). Encourage physical activity 5 times per week for 30 minutes per day.      Orders:  -     rosuvastatin (CRESTOR) 5 MG tablet; Take 1 tablet (5 mg total) by mouth once daily.  Dispense: 90 tablet; Refill: 3    3. Major depressive disorder, recurrent episode, mild  Overview:  On Trintellix 10 mg daily    Assessment & Plan:  Remains Problematic.  Discussed increasing Trintellix but 20 mg has caused dizziness in the past.  We also discussed addition of Wellbutrin but patient declines    Orders:  -     vortioxetine (TRINTELLIX) 10 mg Tab; Take 1 tablet (10 mg total) by mouth once daily.  Dispense: 90 tablet; Refill: 3    4. Vitamin D deficiency  Assessment & Plan:  Obtain vit D level     Orders:  -     Vitamin D; Future; Expected date: 02/12/2025    5. Generalized anxiety disorder  Overview:  On Trintellix 10 mg daily    Assessment & Plan:  Practice deep breathing when anxiety occurs.  Exercise daily. Get sunlight daily.  Avoid caffeine, alcohol and stimulants.  Practice positive phrases and repeat throughout the day, along with yoga  and relaxation techniques.  Set healthy boundaries, avoid people and conversations that increase stress.  Report any suicidal or homicidal ideations immediately. If clinic is closed, go to nearest emergency room.     Orders:  -     vortioxetine (TRINTELLIX) 10 mg Tab; Take 1 tablet (10 mg total) by mouth once daily.  Dispense: 90 tablet; Refill: 3    6. Osteoarthritis, unspecified osteoarthritis type, unspecified site  -     diclofenac sodium (VOLTAREN) 1 % Gel; Apply 2 g topically 4 (four) times daily as needed (pain).  Dispense: 100 g; Refill: 3    7. Screening mammogram for breast cancer  -     Mammo Digital Screening Bilat w/ Gustavo; Future; Expected date: 02/12/2025         Future Appointments   Date Time Provider Department Center   7/2/2025  7:30 AM Brunilda Landeros ANP [facility] GYN [place]    7/9/2025  8:50 AM LAB, [facility] LABORATORY DRAW STATION [facility]   7/18/2025  8:00 AM [facility]       Follow up in about 5 months (around 7/12/2025) for HTN, With Fasting Labs Prior. Call sooner if needed.    CHELSEA Azul         This note was generated with the assistance of ambient listening technology. Verbal consent was obtained by the patient and accompanying visitor(s) for the recording of patient appointment to facilitate this note. I attest to having reviewed and edited the generated note for accuracy, though some syntax or spelling errors may persist. Please contact the author of this note for any clarification.

## 2025-02-14 ENCOUNTER — HOSPITAL ENCOUNTER (OUTPATIENT)
Dept: RADIOLOGY | Facility: HOSPITAL | Age: 65
Discharge: HOME | End: 2025-02-14
Attending: NURSE PRACTITIONER
Payer: MEDICAID

## 2025-02-14 DIAGNOSIS — Z12.31 SCREENING MAMMOGRAM FOR BREAST CANCER: ICD-10-CM

## 2025-02-14 PROCEDURE — 77067 SCR MAMMO BI INCL CAD: CPT | Mod: TC

## 2025-03-20 ENCOUNTER — TELEPHONE (OUTPATIENT)
Dept: FAMILY MEDICINE | Facility: CLINIC | Age: 65
End: 2025-03-20
Payer: MEDICAID

## 2025-05-09 DIAGNOSIS — R06.09 DOE (DYSPNEA ON EXERTION): ICD-10-CM

## 2025-05-09 RX ORDER — ALBUTEROL SULFATE 90 UG/1
INHALANT RESPIRATORY (INHALATION)
Qty: 18 G | Refills: 3 | Status: SHIPPED | OUTPATIENT
Start: 2025-05-09

## 2025-05-28 ENCOUNTER — OFFICE VISIT (OUTPATIENT)
Dept: FAMILY MEDICINE | Facility: CLINIC | Age: 65
End: 2025-05-28
Payer: MEDICAID

## 2025-05-28 VITALS
SYSTOLIC BLOOD PRESSURE: 120 MMHG | HEART RATE: 78 BPM | DIASTOLIC BLOOD PRESSURE: 80 MMHG | BODY MASS INDEX: 34.52 KG/M2 | HEIGHT: 65 IN | WEIGHT: 207.19 LBS | TEMPERATURE: 98 F | OXYGEN SATURATION: 98 %

## 2025-05-28 DIAGNOSIS — K21.9 GASTROESOPHAGEAL REFLUX DISEASE WITHOUT ESOPHAGITIS: Primary | ICD-10-CM

## 2025-05-28 PROCEDURE — 1159F MED LIST DOCD IN RCRD: CPT | Mod: CPTII,,, | Performed by: NURSE PRACTITIONER

## 2025-05-28 PROCEDURE — 1160F RVW MEDS BY RX/DR IN RCRD: CPT | Mod: CPTII,,, | Performed by: NURSE PRACTITIONER

## 2025-05-28 PROCEDURE — 3008F BODY MASS INDEX DOCD: CPT | Mod: CPTII,,, | Performed by: NURSE PRACTITIONER

## 2025-05-28 PROCEDURE — 99213 OFFICE O/P EST LOW 20 MIN: CPT | Mod: ,,, | Performed by: NURSE PRACTITIONER

## 2025-05-28 PROCEDURE — 3074F SYST BP LT 130 MM HG: CPT | Mod: CPTII,,, | Performed by: NURSE PRACTITIONER

## 2025-05-28 PROCEDURE — 3079F DIAST BP 80-89 MM HG: CPT | Mod: CPTII,,, | Performed by: NURSE PRACTITIONER

## 2025-05-28 RX ORDER — FAMOTIDINE 20 MG/1
20 TABLET, FILM COATED ORAL 2 TIMES DAILY
Qty: 60 TABLET | Refills: 3 | Status: SHIPPED | OUTPATIENT
Start: 2025-05-28 | End: 2026-05-28

## 2025-05-28 NOTE — PROGRESS NOTES
Patient ID: Martha Merino  : 1960    Chief Complaint: Gastroesophageal Reflux    Allergies: Patient is allergic to esomeprazole, omeprazole magnesium, codeine, promethazine-phenyleph-codeine, nexium [esomeprazole magnesium], and prilosec [omeprazole].     Subjective :  The patient is a 64 y.o. White female who presents to clinic for follow up on Gastroesophageal Reflux   Gastroesophageal Reflux       History of Present Illness    HPI:  Patient reports a history of chest pain, which she initially considered warranting cardiologist evaluation but was unable to secure an appointment this month. The chest pain has since resolved and was determined to be acid reflux. She has a long-standing history of acid reflux, which began after hernia surgery. She has symptoms particularly with certain foods, notably avoiding red sauces, tomatoes, and spaghetti (even with white sauce). She can occasionally tolerate pizza with white sauce, but generally avoids red-colored foods. She also reports intermittent trouble with spicy and fried foods. She manages symptoms by drinking approximately 7 bottles of water daily and using OTC remedies like Mylanta for symptom relief, as she reports being allergic to most acid reflux medications.    She denies current heartburn or chest pains.    MEDICATIONS:  Patient is on Mylax as needed for acid reflux.    MEDICAL HISTORY:  Patient has a history of acid reflux, which developed after hernia surgery. She also has a history of hernia, which preceded the acid reflux.    SURGICAL HISTORY:  Patient underwent hernia surgery on an unspecified date. The surgery was performed to address her hernia, but it resulted in the development of acid reflux.    ALLERGIES:  Patient is allergic to most acid reflux medicines, though the specific reaction is not detailed.    SOCIAL HISTORY:  Smoking: Denies      ROS:  ROS as indicated in HPI.           Social History:  reports that she has never smoked. She  "has never been exposed to tobacco smoke. She has never used smokeless tobacco. She reports that she does not drink alcohol and does not use drugs.    Past Medical History:  has a past medical history of Arthritis, Elevated hematocrit, GERD (gastroesophageal reflux disease), Hiatal hernia, High alkaline phosphatase, Hypertension, Obesity, and SOB (shortness of breath) on exertion.    Care Team: Patient Care Team:  Daisy Cordova FNP as PCP - General (Family Medicine)  Brunilda Landeros, ANP as Nurse Practitioner (Gynecology)  Luis Fernando Murillo MD as Consulting Physician (Cardiology)     Current Medications:  Current Outpatient Medications   Medication Instructions    albuterol (PROVENTIL/VENTOLIN HFA) 90 mcg/actuation inhaler INHALE 1 OR 2 PUFFS BY MOUTH EVERY SIX HOURS AS NEEDED SHORTNESS OF BREATH AND WHEEZING    amLODIPine (NORVASC) 10 mg, Oral, Daily    aspirin (ECOTRIN) 81 mg, Daily    calcium-vitamin D3 (OS-ANGELICA 500 + D3) 500 mg-5 mcg (200 unit) per tablet 1 tablet, Daily    diclofenac sodium (VOLTAREN) 2 g, Topical (Top), 4 times daily PRN    famotidine (PEPCID) 20 mg, Oral, 2 times daily    metoprolol tartrate (LOPRESSOR) 12.5 mg, Oral, 2 times daily    nitroGLYCERIN (NITROSTAT) 0.4 mg, As needed (PRN)    ondansetron (ZOFRAN-ODT) 4 mg, Oral, Every 6 hours PRN    rosuvastatin (CRESTOR) 5 mg, Oral, Daily    TRINTELLIX 10 mg, Oral, Daily         Visit Vitals  /80 (Patient Position: Sitting)   Pulse 78   Temp 97.9 °F (36.6 °C)   Ht 5' 5" (1.651 m)   Wt 94 kg (207 lb 3.2 oz)   SpO2 98%   BMI 34.48 kg/m²       Physical Exam  Vitals reviewed.   Constitutional:       Appearance: Normal appearance.   HENT:      Head: Normocephalic and atraumatic.      Nose: No congestion or rhinorrhea.      Mouth/Throat:      Pharynx: No oropharyngeal exudate or posterior oropharyngeal erythema.   Cardiovascular:      Rate and Rhythm: Normal rate and regular rhythm.      Heart sounds: Normal heart sounds.   Pulmonary:      Effort: " Pulmonary effort is normal.      Breath sounds: Normal breath sounds.   Abdominal:      General: Bowel sounds are normal.      Palpations: Abdomen is soft.      Tenderness: There is no abdominal tenderness.   Skin:     General: Skin is warm and dry.      Findings: No rash.   Neurological:      Mental Status: She is alert and oriented to person, place, and time. Mental status is at baseline.          Labs Reviewed:  Chemistry:  Lab Results   Component Value Date     02/04/2025    K 4.8 02/04/2025    BUN 17 02/04/2025    CREATININE 0.94 02/04/2025    EGFRNORACEVR 68 02/04/2025    CALCIUM 9.4 02/04/2025    ALKPHOS 146 (H) 02/04/2025    ALBUMIN 4.3 02/04/2025    BILIDIR 0.3 12/16/2021    IBILI 0.40 12/16/2021    AST 25 02/04/2025    ALT 18 02/04/2025    PCBIMSQC42CI 50 02/12/2025    TSH 2.340 07/17/2024    HPUFCM9OPVN 0.90 03/09/2021        Lab Results   Component Value Date    HGBA1C 5.4 07/17/2024        Hematology:  Lab Results   Component Value Date    WBC 6.65 02/04/2025    RBC 5.10 02/04/2025    HGB 15.5 02/04/2025    HCT 47.1 02/04/2025    MCV 92.4 02/04/2025    MCH 30.4 02/04/2025    MCHC 32.9 02/04/2025    RDW 13.4 02/04/2025     02/04/2025    MPV 10.8 02/04/2025       Lipid Panel:  Lab Results   Component Value Date    CHOL 142 02/04/2025    HDL 54 02/04/2025    LDLDIRECT 68.1 02/04/2025    TRIG 73 02/04/2025    TOTALCHOLEST 4 06/20/2022        Diagnosis:  1. Gastroesophageal reflux disease without esophagitis  Assessment & Plan:  EGD 3/23/22: 2cm hiatal hernia. Nissen fludoplication, mild gastritis. Allergy to PPIs, begin famotodine BID    Orders:  -     famotidine (PEPCID) 20 MG tablet; Take 1 tablet (20 mg total) by mouth 2 (two) times daily.  Dispense: 60 tablet; Refill: 3         Assessment & Plan    IMPRESSION:  - Assessed history of acid reflux and hernia surgery.  - Considered allergies to most acid reflux medications.  - Evaluated dietary triggers and lifestyle factors contributing to  acid reflux.    GASTROESOPHAGEAL REFLUX DISEASE (GERD):  - Started famotidine, which can be taken twice daily due to different mechanism of action.  - Explained importance of remaining upright for at least 1 hour after eating or drinking to prevent acid reflux.  - Discussed avoiding common dietary triggers including spicy foods, caffeinated beverages, chocolate, high-fat foods, red sauce, and tomatoes.    FOLLOW-UP:  - Follow up in a few weeks if medication is not effective to discuss next steps.         Future Appointments   Date Time Provider Department Center   7/2/2025  7:30 AM Brunilda Landeros ANP [facility] GYN [place]    7/9/2025  8:50 AM LAB, [facility] LABORATORY DRAW STATION [facility] KENNY [place]   7/18/2025  8:00 AM Daisy Cordova FNP [facility] MAYANK [place]       Follow up if symptoms worsen or fail to improve. Call sooner if needed.    JOSTIN DUNLAP             This note was generated with the assistance of ambient listening technology. Verbal consent was obtained by the patient and accompanying visitor(s) for the recording of patient appointment to facilitate this note. I attest to having reviewed and edited the generated note for accuracy, though some syntax or spelling errors may persist. Please contact the author of this note for any clarification.

## 2025-05-28 NOTE — ASSESSMENT & PLAN NOTE
EGD 3/23/22: 2cm hiatal hernia. Nissen fludoplication, mild gastritis. Allergy to PPIs, begin famotodine BID

## 2025-07-09 PROCEDURE — 80053 COMPREHEN METABOLIC PANEL: CPT | Performed by: NURSE PRACTITIONER

## 2025-08-05 ENCOUNTER — OFFICE VISIT (OUTPATIENT)
Dept: FAMILY MEDICINE | Facility: CLINIC | Age: 65
End: 2025-08-05
Payer: MEDICAID

## 2025-08-05 VITALS
WEIGHT: 208.38 LBS | HEART RATE: 71 BPM | DIASTOLIC BLOOD PRESSURE: 88 MMHG | BODY MASS INDEX: 34.72 KG/M2 | SYSTOLIC BLOOD PRESSURE: 128 MMHG | OXYGEN SATURATION: 98 % | HEIGHT: 65 IN | TEMPERATURE: 98 F

## 2025-08-05 DIAGNOSIS — I10 PRIMARY HYPERTENSION: Primary | ICD-10-CM

## 2025-08-05 DIAGNOSIS — F41.1 GENERALIZED ANXIETY DISORDER: ICD-10-CM

## 2025-08-05 DIAGNOSIS — F33.0 MAJOR DEPRESSIVE DISORDER, RECURRENT EPISODE, MILD: ICD-10-CM

## 2025-08-05 PROCEDURE — 3079F DIAST BP 80-89 MM HG: CPT | Mod: CPTII,,, | Performed by: NURSE PRACTITIONER

## 2025-08-05 PROCEDURE — 3074F SYST BP LT 130 MM HG: CPT | Mod: CPTII,,, | Performed by: NURSE PRACTITIONER

## 2025-08-05 PROCEDURE — 99214 OFFICE O/P EST MOD 30 MIN: CPT | Mod: ,,, | Performed by: NURSE PRACTITIONER

## 2025-08-05 PROCEDURE — 1159F MED LIST DOCD IN RCRD: CPT | Mod: CPTII,,, | Performed by: NURSE PRACTITIONER

## 2025-08-05 PROCEDURE — 1160F RVW MEDS BY RX/DR IN RCRD: CPT | Mod: CPTII,,, | Performed by: NURSE PRACTITIONER

## 2025-08-05 PROCEDURE — 3008F BODY MASS INDEX DOCD: CPT | Mod: CPTII,,, | Performed by: NURSE PRACTITIONER

## 2025-08-05 RX ORDER — VORTIOXETINE 10 MG/1
10 TABLET, FILM COATED ORAL DAILY
Qty: 90 TABLET | Refills: 1 | Status: SHIPPED | OUTPATIENT
Start: 2025-08-05 | End: 2026-08-05

## 2025-08-05 NOTE — ASSESSMENT & PLAN NOTE
Lab Results   Component Value Date    BUN 12 07/09/2025    CREATININE 0.89 07/09/2025    EGFRNORACEVR 73 07/09/2025    K 4.8 07/09/2025

## 2025-08-05 NOTE — PROGRESS NOTES
Patient ID: Martha Merino  : 1960     Chief Complaint: Hypertension    Allergies: Patient is allergic to esomeprazole, omeprazole magnesium, codeine, promethazine-phenyleph-codeine, nexium [esomeprazole magnesium], and prilosec [omeprazole].     History of Present Illness:  The patient is a 64 y.o. White female who presents to clinic for evaluation and management with a chief complaint of Hypertension   Patient has weaned metoprolol 12.5mg bid to only once daily. She is wanting to stop metoprolol d/t hair loss side effect. Denies history of palpitations or CHF.     Hypertension  This is a chronic problem. The current episode started more than 1 year ago. The problem is unchanged. The problem is controlled. Associated symptoms include anxiety. Pertinent negatives include no blurred vision, chest pain, headaches, malaise/fatigue, neck pain, orthopnea, palpitations, peripheral edema, PND, shortness of breath or sweats. Risk factors for coronary artery disease include family history and obesity. Past treatments include beta blockers and calcium channel blockers. The current treatment provides significant improvement. Compliance problems include medication side effects.         Past Medical History:  has a past medical history of Arthritis, Elevated hematocrit, GERD (gastroesophageal reflux disease), Hiatal hernia, High alkaline phosphatase, Hypertension, Obesity, and SOB (shortness of breath) on exertion.    Social History:  reports that she has never smoked. She has never been exposed to tobacco smoke. She has never used smokeless tobacco. She reports that she does not drink alcohol and does not use drugs.    Care Team: Patient Care Team:  Daisy Cordova FNP as PCP - General (Family Medicine)  Brunilda Landeros ANP as Nurse Practitioner (Gynecology)  Luis Fernando Murillo MD as Consulting Physician (Cardiology)     Current Medications:  Current Outpatient Medications   Medication Instructions    albuterol  "(PROVENTIL/VENTOLIN HFA) 90 mcg/actuation inhaler INHALE 1 OR 2 PUFFS BY MOUTH EVERY SIX HOURS AS NEEDED SHORTNESS OF BREATH AND WHEEZING    amLODIPine (NORVASC) 10 mg, Oral, Daily    aspirin (ECOTRIN) 81 mg, Daily    calcium-vitamin D3 (OS-ANGELICA 500 + D3) 500 mg-5 mcg (200 unit) per tablet 1 tablet, Daily    diclofenac sodium (VOLTAREN) 2 g, Topical (Top), 4 times daily PRN    famotidine (PEPCID) 20 mg, Oral, 2 times daily    nitroGLYCERIN (NITROSTAT) 0.4 mg, As needed (PRN)    ondansetron (ZOFRAN-ODT) 4 mg, Oral, Every 6 hours PRN    rosuvastatin (CRESTOR) 5 mg, Oral, Daily    TRINTELLIX 10 mg, Oral, Daily       Review of Systems   Constitutional:  Negative for malaise/fatigue.   Eyes:  Negative for blurred vision.   Respiratory:  Negative for apnea, chest tightness and shortness of breath.    Cardiovascular:  Negative for chest pain, palpitations, orthopnea and PND.   Musculoskeletal:  Negative for neck pain.   Neurological:  Negative for headaches.        Visit Vitals  /88 (BP Location: Left arm, Patient Position: Sitting)   Pulse 71   Temp 97.5 °F (36.4 °C) (Temporal)   Ht 5' 5" (1.651 m)   Wt 94.5 kg (208 lb 6.4 oz)   SpO2 98%   BMI 34.68 kg/m²       Physical Exam  Vitals reviewed.   Constitutional:       Appearance: Normal appearance.   HENT:      Head: Normocephalic and atraumatic.      Nose: No congestion or rhinorrhea.      Mouth/Throat:      Pharynx: No oropharyngeal exudate or posterior oropharyngeal erythema.   Cardiovascular:      Rate and Rhythm: Normal rate and regular rhythm.      Heart sounds: Normal heart sounds.   Pulmonary:      Effort: Pulmonary effort is normal.      Breath sounds: Normal breath sounds.   Abdominal:      General: Bowel sounds are normal.      Palpations: Abdomen is soft.      Tenderness: There is no abdominal tenderness.   Musculoskeletal:      Right lower leg: No edema.      Left lower leg: No edema.   Skin:     General: Skin is warm and dry.      Findings: No rash. "   Neurological:      Mental Status: She is alert and oriented to person, place, and time. Mental status is at baseline.   Psychiatric:         Mood and Affect: Mood normal.          Labs Reviewed:  Chemistry:  Lab Results   Component Value Date     07/09/2025    K 4.8 07/09/2025    BUN 12 07/09/2025    CREATININE 0.89 07/09/2025    EGFRNORACEVR 73 07/09/2025    CALCIUM 9.6 07/09/2025    ALKPHOS 146 (H) 07/09/2025    ALBUMIN 4.1 07/09/2025    BILIDIR 0.3 12/16/2021    IBILI 0.40 12/16/2021    AST 24 07/09/2025    ALT 18 07/09/2025    SUOMFSDI94LQ 50 02/12/2025    TSH 2.340 07/17/2024    XOWSTY9DQUR 0.90 03/09/2021        Lab Results   Component Value Date    HGBA1C 5.4 07/17/2024        Hematology:  Lab Results   Component Value Date    WBC 6.65 02/04/2025    RBC 5.10 02/04/2025    HGB 15.5 02/04/2025    HCT 47.1 02/04/2025    MCV 92.4 02/04/2025    MCH 30.4 02/04/2025    MCHC 32.9 02/04/2025    RDW 13.4 02/04/2025     02/04/2025    MPV 10.8 02/04/2025       Lipid Panel:  Lab Results   Component Value Date    CHOL 142 02/04/2025    HDL 54 02/04/2025    LDLCALC 106.00 06/20/2022    LDLDIRECT 68.1 02/04/2025    TRIG 73 02/04/2025    TOTALCHOLEST 4 06/20/2022        Assessment & Plan:  1. Primary hypertension  Overview:  Bp stable with Amlodipine 10 mg and metoprolol 12.5 mg, patient wants to stop metoprolol d/t possible hair loss.  Stop metoprolol as only taking once daily already. Will recheck bp in 2 weeks, if elevated add olmesartan 20    Assessment & Plan:  Lab Results   Component Value Date    BUN 12 07/09/2025    CREATININE 0.89 07/09/2025    EGFRNORACEVR 73 07/09/2025    K 4.8 07/09/2025         2. Generalized anxiety disorder  Overview:  Stable with Trintellix 10 mg daily    Orders:  -     vortioxetine (TRINTELLIX) 10 mg Tab; Take 1 tablet (10 mg total) by mouth once daily.  Dispense: 90 tablet; Refill: 1    3. Major depressive disorder, recurrent episode, mild  Overview:  Stable with Trintellix 10  mg daily    Orders:  -     vortioxetine (TRINTELLIX) 10 mg Tab; Take 1 tablet (10 mg total) by mouth once daily.  Dispense: 90 tablet; Refill: 1         Future Appointments   Date Time Provider Department Center   8/19/2025  7:15 AM Pan Bellamy APRN [facility]   9/9/2025  7:30 AM Brunilda Landeros ANP [facility]   12/10/2025  8:40 AM LAB, [facility] LABORATORY DRAW STATION [facility] KENNY Juarez [facility]   12[facility]16/2025 10:00 [facility]       Follow up for 1) 2 week f/u HTN  2) 3-4 mo with julien donahue, Fasting labs prior. Call sooner if needed.    JOSTIN DUNLAP

## 2025-08-12 DIAGNOSIS — M19.90 OSTEOARTHRITIS, UNSPECIFIED OSTEOARTHRITIS TYPE, UNSPECIFIED SITE: ICD-10-CM

## 2025-08-12 RX ORDER — DICLOFENAC SODIUM 10 MG/G
2 GEL TOPICAL 4 TIMES DAILY PRN
Qty: 100 G | Refills: 3 | Status: SHIPPED | OUTPATIENT
Start: 2025-08-12

## 2025-08-19 ENCOUNTER — OFFICE VISIT (OUTPATIENT)
Dept: FAMILY MEDICINE | Facility: CLINIC | Age: 65
End: 2025-08-19
Payer: MEDICAID

## 2025-08-19 VITALS
SYSTOLIC BLOOD PRESSURE: 122 MMHG | HEIGHT: 65 IN | BODY MASS INDEX: 34.82 KG/M2 | OXYGEN SATURATION: 98 % | TEMPERATURE: 97 F | HEART RATE: 68 BPM | DIASTOLIC BLOOD PRESSURE: 72 MMHG | WEIGHT: 209 LBS

## 2025-08-19 DIAGNOSIS — F33.0 MAJOR DEPRESSIVE DISORDER, RECURRENT EPISODE, MILD: ICD-10-CM

## 2025-08-19 DIAGNOSIS — E78.5 HYPERLIPIDEMIA, UNSPECIFIED HYPERLIPIDEMIA TYPE: ICD-10-CM

## 2025-08-19 DIAGNOSIS — M85.80 OSTEOPENIA, UNSPECIFIED LOCATION: ICD-10-CM

## 2025-08-19 DIAGNOSIS — I10 PRIMARY HYPERTENSION: Primary | ICD-10-CM

## 2025-08-19 DIAGNOSIS — F41.1 GENERALIZED ANXIETY DISORDER: ICD-10-CM

## 2025-08-19 PROBLEM — R59.0 LEFT CERVICAL LYMPHADENOPATHY: Status: RESOLVED | Noted: 2023-10-11 | Resolved: 2025-08-19

## 2025-08-19 PROBLEM — R00.1 BRADYCARDIA: Status: RESOLVED | Noted: 2023-06-20 | Resolved: 2025-08-19

## 2025-08-19 PROCEDURE — 99214 OFFICE O/P EST MOD 30 MIN: CPT | Mod: ,,, | Performed by: NURSE PRACTITIONER

## 2025-08-19 PROCEDURE — 3008F BODY MASS INDEX DOCD: CPT | Mod: CPTII,,, | Performed by: NURSE PRACTITIONER

## 2025-08-19 PROCEDURE — 3074F SYST BP LT 130 MM HG: CPT | Mod: CPTII,,, | Performed by: NURSE PRACTITIONER

## 2025-08-19 PROCEDURE — 1159F MED LIST DOCD IN RCRD: CPT | Mod: CPTII,,, | Performed by: NURSE PRACTITIONER

## 2025-08-19 PROCEDURE — 3078F DIAST BP <80 MM HG: CPT | Mod: CPTII,,, | Performed by: NURSE PRACTITIONER

## 2025-08-19 RX ORDER — AMLODIPINE BESYLATE 10 MG/1
10 TABLET ORAL DAILY
Qty: 90 TABLET | Refills: 0 | Status: SHIPPED | OUTPATIENT
Start: 2025-08-19 | End: 2026-08-19

## 2025-08-19 RX ORDER — PSYLLIUM HUSK 0.4 G
1 CAPSULE ORAL 2 TIMES DAILY WITH MEALS
Qty: 60 TABLET | Refills: 5 | Status: SHIPPED | OUTPATIENT
Start: 2025-08-19

## 2025-08-19 RX ORDER — VORTIOXETINE 10 MG/1
10 TABLET, FILM COATED ORAL DAILY
Qty: 90 TABLET | Refills: 0 | Status: SHIPPED | OUTPATIENT
Start: 2025-08-19 | End: 2026-08-19

## 2025-08-19 RX ORDER — ROSUVASTATIN CALCIUM 5 MG/1
5 TABLET, COATED ORAL DAILY
Qty: 90 TABLET | Refills: 0 | Status: SHIPPED | OUTPATIENT
Start: 2025-08-19 | End: 2026-08-19